# Patient Record
Sex: FEMALE | Race: WHITE | NOT HISPANIC OR LATINO | Employment: OTHER | URBAN - METROPOLITAN AREA
[De-identification: names, ages, dates, MRNs, and addresses within clinical notes are randomized per-mention and may not be internally consistent; named-entity substitution may affect disease eponyms.]

---

## 2017-07-13 ENCOUNTER — HOSPITAL ENCOUNTER (OUTPATIENT)
Dept: INTENSIVE CARE | Facility: HOSPITAL | Age: 77
End: 2017-07-14
Attending: UROLOGY | Admitting: UROLOGY

## 2017-12-15 ENCOUNTER — HOSPITAL ENCOUNTER (OUTPATIENT)
Dept: ONCOLOGY | Facility: HOSPITAL | Age: 77
End: 2017-12-16
Attending: INTERNAL MEDICINE | Admitting: INTERNAL MEDICINE

## 2017-12-15 LAB
ABS NEUT (OLG): 9.66 X10(3)/MCL (ref 2.1–9.2)
ALBUMIN SERPL-MCNC: 4.1 GM/DL (ref 3.4–5)
ALBUMIN/GLOB SERPL: 0.9 RATIO (ref 1.1–2)
ALP SERPL-CCNC: 102 UNIT/L (ref 38–126)
ALT SERPL-CCNC: 20 UNIT/L (ref 12–78)
APTT PPP: 37.1 SECOND(S) (ref 24.8–36.9)
AST SERPL-CCNC: 34 UNIT/L (ref 15–37)
BASOPHILS # BLD AUTO: 0 X10(3)/MCL (ref 0–0.2)
BASOPHILS NFR BLD AUTO: 0 %
BILIRUB SERPL-MCNC: 0.7 MG/DL (ref 0.2–1)
BILIRUBIN DIRECT+TOT PNL SERPL-MCNC: 0.1
BILIRUBIN DIRECT+TOT PNL SERPL-MCNC: 0.6 MG/DL (ref 0–0.8)
BNP BLD-MCNC: 34 PG/ML (ref 0–100)
BUN SERPL-MCNC: 13 MG/DL (ref 7–18)
CALCIUM SERPL-MCNC: 9 MG/DL (ref 8.5–10.1)
CHLORIDE SERPL-SCNC: 103 MMOL/L (ref 98–107)
CK MB SERPL-MCNC: 1.6 NG/ML (ref 0.5–3.6)
CK SERPL-CCNC: 102 UNIT/L (ref 26–192)
CO2 SERPL-SCNC: 27 MMOL/L (ref 21–32)
CREAT SERPL-MCNC: 0.77 MG/DL (ref 0.55–1.02)
EOSINOPHIL # BLD AUTO: 0.1 X10(3)/MCL (ref 0–0.9)
EOSINOPHIL NFR BLD AUTO: 1 %
ERYTHROCYTE [DISTWIDTH] IN BLOOD BY AUTOMATED COUNT: 12.3 % (ref 11.5–17)
GLOBULIN SER-MCNC: 4.6 GM/DL (ref 2.4–3.5)
GLUCOSE SERPL-MCNC: 114 MG/DL (ref 74–106)
HCT VFR BLD AUTO: 47.4 % (ref 37–47)
HGB BLD-MCNC: 15.4 GM/DL (ref 12–16)
INR PPP: 0.94 (ref 0–1.27)
LYMPHOCYTES # BLD AUTO: 1.4 X10(3)/MCL (ref 0.6–4.6)
LYMPHOCYTES NFR BLD AUTO: 12 %
MCH RBC QN AUTO: 31 PG (ref 27–31)
MCHC RBC AUTO-ENTMCNC: 32.5 GM/DL (ref 33–36)
MCV RBC AUTO: 95.4 FL (ref 80–94)
MONOCYTES # BLD AUTO: 0.5 X10(3)/MCL (ref 0.1–1.3)
MONOCYTES NFR BLD AUTO: 4 %
NEUTROPHILS # BLD AUTO: 9.66 X10(3)/MCL (ref 1.4–7.9)
NEUTROPHILS NFR BLD AUTO: 82 %
PLATELET # BLD AUTO: 290 X10(3)/MCL (ref 130–400)
PMV BLD AUTO: 9.5 FL (ref 9.4–12.4)
POC TROPONIN: 0 NG/ML (ref 0–0.08)
POTASSIUM SERPL-SCNC: 3.4 MMOL/L (ref 3.5–5.1)
PROT SERPL-MCNC: 8.7 GM/DL (ref 6.4–8.2)
PROTHROMBIN TIME: 12.9 SECOND(S) (ref 12.2–14.7)
RBC # BLD AUTO: 4.97 X10(6)/MCL (ref 4.2–5.4)
SODIUM SERPL-SCNC: 138 MMOL/L (ref 136–145)
TROPONIN I SERPL-MCNC: 0.02 NG/ML (ref 0.02–0.49)
TROPONIN I SERPL-MCNC: 0.02 NG/ML (ref 0.02–0.49)
WBC # SPEC AUTO: 11.7 X10(3)/MCL (ref 4.5–11.5)

## 2017-12-16 LAB
CHOLEST SERPL-MCNC: 190 MG/DL (ref 0–200)
CHOLEST/HDLC SERPL: 3.3 {RATIO} (ref 0–4)
CK MB SERPL-MCNC: 1.4 NG/ML (ref 0.5–3.6)
CK SERPL-CCNC: 94 UNIT/L (ref 26–192)
HDLC SERPL-MCNC: 57 MG/DL (ref 35–60)
LDLC SERPL CALC-MCNC: 121 MG/DL (ref 0–129)
TRIGL SERPL-MCNC: 62 MG/DL (ref 30–150)
TROPONIN I SERPL-MCNC: 0.02 NG/ML (ref 0.02–0.49)
VLDLC SERPL CALC-MCNC: 12 MG/DL

## 2018-09-06 ENCOUNTER — HISTORICAL (OUTPATIENT)
Dept: RADIOLOGY | Facility: HOSPITAL | Age: 78
End: 2018-09-06

## 2022-04-30 NOTE — ED PROVIDER NOTES
Patient:   Fanny Armijo            MRN: [id number]            FIN: [id number]               Age:   77 years     Sex:  Female     :  1940   Associated Diagnoses:   Substernal chest pain   Author:   Ashlie Mendoza MD      Basic Information   Time seen: Date & time 12/15/2017 10:46:00.   History source: Patient.   Arrival mode: Ambulance.   History limitation: None.   Additional information: Chief Complaint from Nursing Triage Note : Chief Complaint   12/15/2017 10:07 CST     Chief Complaint           Pt c/o chest pain since 0800 this morning.  .      History of Present Illness   The patient presents with chest pain.  The onset was 3  hours ago.  The course/duration of symptoms is improving.  Location: substernal. Radiating pain: none. The character of symptoms is heaviness and pressure.  The degree at onset was minimal, 4 /10.  The degree at maximum was minimal, 4 /10.  .  The degree at present is none.  The exacerbating factor is none.  Risk factors consist of none.  Prior episodes: none.  Therapy today None.  Associated symptoms: shortness of breath, nausea, diaphoresis, denies vomiting, denies anxiety and denies palpitations.    77 year old white female with a history of IBD, diverticulosis, and nephrolithiasis who presents with substernal chest pain x 3 hours. Pain started while patient was sitting writing [employer] cards this morning, started as epigastric gas like pain that then moved into her chest - described as pressure-like pain, no radiation to arm or jaw, no aggravating or alleviating factors. Patient states pain improved significantly since onset, did not take any rx or OTC meds for pain. Reports associated sob, diaphoresis, and nausea (no vomiting). No cardiac history. Never smoker. Denies any history of similar episodes. Takes multivitamin daily, no other meds.        Review of Systems   Constitutional symptoms:  No fever, no chills, no sweats, no weakness, no fatigue, no decreased  activity.    Skin symptoms:  No jaundice, no rash, no petechiae, no lesion.    Eye symptoms:  Vision unchanged, no recent vision problems, no pain, no blurred vision.    ENMT symptoms:  No ear pain, no sore throat, no nasal congestion, no sinus pain.    Respiratory symptoms:  Shortness of breath, no cough, no hemoptysis, no wheezing.    Cardiovascular symptoms:  Chest pain, pressure, substernal, diaphoresis, no palpitations, no syncope, no peripheral edema.    Gastrointestinal symptoms:  Nausea, no abdominal pain, no vomiting, no diarrhea, no constipation.    Genitourinary symptoms:  No dysuria, no hematuria, no vaginal bleeding.    Musculoskeletal symptoms:  No back pain, no Joint pain.    Neurologic symptoms:  No headache, no dizziness, no numbness, no tingling, no weakness.    Psychiatric symptoms:  No anxiety, no depression.    Endocrine symptoms:  Negative except as documented in HPI.   Hematologic/Lymphatic symptoms:  Negative except as documented in HPI.             Additional review of systems information: All other systems reviewed and otherwise negative.      Health Status   Allergies: No known allergies.   Medications: Multivitamin.      Past Medical/ Family/ Social History   Medical history: IBD/Ulcerative colitis (in remission), Diverticulosis, Nephrolithiasis, Endometriosis (resolved).   Surgical history: Hysterectomy, Appendectomy, Cystoscopy w/ureteral stent placement, Cystocele with perivaginal repair, polypectomy (colon), Ovarian cyst removal.   Social history: Alcohol use: never, Tobacco use: never, Drug use: never.      Physical Examination               Vital Signs   Vital Signs   12/15/2017 10:07 CST     Temperature Oral          36.9 DegC                             Temperature Oral (calculated)             98.42 DegF                             Peripheral Pulse Rate     82 bpm                             Respiratory Rate          18 br/min                             SpO2                       97 %                             Oxygen Therapy            Room air                             Systolic Blood Pressure   151 mmHg  HI                             Diastolic Blood Pressure  90 mmHg  .   General:  Alert, no acute distress.    Skin:  Warm, dry, intact.    Head:  Normocephalic, atraumatic.    Neck:  Supple, trachea midline, no tenderness, no carotid bruit.    Eye:  Pupils are equal, round and reactive to light, extraocular movements are intact, normal conjunctiva, vision unchanged.    Ears, nose, mouth and throat:  Tympanic membranes clear, oral mucosa moist, no pharyngeal erythema or exudate.    Cardiovascular:  Regular rate and rhythm, No murmur, Normal peripheral perfusion, No edema.    Respiratory:  Lungs are clear to auscultation, respirations are non-labored, breath sounds are equal, Symmetrical chest wall expansion.    Chest wall:  No tenderness, No deformity.    Back:  Nontender, Normal range of motion, Normal alignment, no step-offs.    Musculoskeletal:  Normal ROM, normal strength, no tenderness, no swelling, no deformity.    Gastrointestinal:  Soft, Nontender, Non distended, Normal bowel sounds, No organomegaly.    Genitourinary:  No suprapubic or CVA tenderness appreciated.   Neurological:  Alert and oriented to person, place, time, and situation, No focal neurological deficit observed, CN II-XII intact, normal sensory observed, normal motor observed.    Lymphatics:  No lymphadenopathy.   Psychiatric:  Cooperative.      Medical Decision Making   Differential Diagnosis:  Unstable angina, angina, anxiety, atypical chest pain, costochondritis, chest wall pain.    Documents reviewed:  Emergency department nurses' notes, emergency department records, prior records.    Electrocardiogram:  Time 12/15/2017 10:09:00, rate 83, normal sinus rhythm, no ectopy, normal MS & QRS intervals, Nonspecific T wave abnormality.    Results review:  Lab results : Lab View   12/15/2017 10:36 CST     POC BNP iSTAT              34 pg/mL    12/15/2017 10:35 CST     POC Troponin              0.00 ng/mL    12/15/2017 10:30 CST     Sodium Lvl                138 mmol/L                             Potassium Lvl             3.4 mmol/L  LOW                             Chloride                  103 mmol/L                             CO2                       27.0 mmol/L                             Calcium Lvl               9.0 mg/dL                             Glucose Lvl               114 mg/dL  HI                             BUN                       13.0 mg/dL                             Creatinine                0.77 mg/dL                             eGFR-AA                   >60 mL/min/1.73 m2  NA                             eGFR-KRYSTYNA                  >60 mL/min/1.73 m2  NA                             Bili Total                0.7 mg/dL                             Bili Direct               0.10                             Bili Indirect             0.60 mg/dL                             AST                       34 unit/L                             ALT                       20 unit/L                             Alk Phos                  102 unit/L                             Total Protein             8.7 gm/dL  HI                             Albumin Lvl               4.10 gm/dL                             Globulin                  4.60 gm/dL  HI                             A/G Ratio                 0.9 ratio  LOW                             Troponin-I                0.02 ng/mL                             PT                        12.9 second(s)                             INR                       0.94                             PTT                       37.1 second(s)  HI                             WBC                       11.7 x10(3)/mcL  HI                             RBC                       4.97 x10(6)/mcL                             Hgb                       15.4 gm/dL                             Hct                        47.4 %  HI                             Platelet                  290 x10(3)/mcL                             MCV                       95.4 fL  HI                             MCH                       31.0 pg                             MCHC                      32.5 gm/dL  LOW                             RDW                       12.3 %                             MPV                       9.5 fL                             Abs Neut                  9.66 x10(3)/mcL  HI                             Neutro Auto               82 %  NA                             Lymph Auto                12 %  NA                             Mono Auto                 4 %  NA                             Eos Auto                  1 %  NA                             Abs Eos                   0.1 x10(3)/mcL                             Basophil Auto             0 %  NA                             Abs Neutro                9.66 x10(3)/mcL  HI                             Abs Lymph                 1.4 x10(3)/mcL                             Abs Mono                  0.5 x10(3)/mcL                             Abs Baso                  0.0 x10(3)/mcL    .   Chest X-Ray:  Time reported 12/15/2017 11:03:00.      Reexamination/ Reevaluation   Time: 12/15/2017 12:14:00 .   Vital signs   results included from flowsheet : Vital Signs   12/15/2017 12:00 CST     Peripheral Pulse Rate     112 bpm  HI                             Heart Rate Monitored      120 bpm  HI                             Respiratory Rate          12 br/min                             SpO2                      97 %                             Oxygen Therapy            Room air                             Systolic Blood Pressure   148 mmHg  HI                             Diastolic Blood Pressure  81 mmHg                             Mean Arterial Pressure, Cuff              103 mmHg     Course: improving.   Pain status: decreased, Patient reports continued pressure like chest pain however  has decreased in intensity. Given GI cocktail for epigastric/left sided gassy abdominal pain. Initial troponins 0.00, EKG showing no acute changes since previous EKG in June 2017. CMP and CBC with no significant abnormalities.      Impression and Plan   Diagnosis   Substernal chest pain ([id number] R07.2)      Calls-Consults   -  12/15/2017 12:20:00 , Abi HERNANDES, ALMAZ Hernandez, consult, recommends admission to rule out acute coronary syndrome with serial EKGs and cardiac enzymes.    Plan   Condition: Improved.    Disposition: Admit time  12/15/2017 12:28:00, Place in Observation Unit.    Counseled: Patient, Regarding diagnosis, Regarding diagnostic results, Regarding treatment plan, Patient indicated understanding of instructions.

## 2022-04-30 NOTE — OP NOTE
DATE OF SURGERY:    07/13/2017    SURGEON:  Thania Bolden MD    PREOPERATIVE DIAGNOSIS:  Left distal ureteral stone.    POSTOPERATIVE DIAGNOSIS:  Left distal ureteral stone.    PROCEDURE:  Left ureteroscopy with Holmium laser lithotripsy, extraction of ureteral stone fragments and placement of ureteral stent.    ANESTHESIA:  General.    INDICATIONS:  The patient is a 76-year-old patient who presented to the hospital a few weeks back with an infected distal ureteral stone estimated between 5 and 6 mm.  The patient underwent ureteral stenting at that time due to the infected nature of her urine and the elevated white count. Ureteroscopy was not offered for fear of making the patient septic.  She tolerated her stent well and was discharged home with oral antibiotics and the elective ureteroscopy with stone extraction, possible laser was planned for today.    PROCEDURE IN DETAIL:  After satisfactory informed written consent was obtained, she was taken to the operating suite.  General anesthesia was administered.  She was placed in the dorsal lithotomy position.  The area of the genitalia was prepped and draped sterilely. The 22-Bolivian cystourethroscope was inserted into the bladder.  The bladder did have some edema surrounding the left ureteral orifice as expected, but no other urothelial lesions.  The tip of the ureteral stent had migrated up into the ureter and it was not possible to grasp this with a flexible cystoscope rasper.  Therefore, the cystoscope was removed after a failed attempt at placement of a wire alongside the stent.  The ureteroscope was used to gain access into the lower ureter.  The tip of the stent was seen.  It was engaged with a three prong grasper and it was extracted from the ureteral orifice.  The cystoscope was reinserted.  The tip of the stent was grasped with a flexible cystoscope grasper and extracted from the urethra.  The guide wire was inserted through the ureteral  stent to the renal pelvis. Over this was inserted an open-ended ureteral catheter.  Retrograde pyelogram confirming placement within the collecting system.  The kidney was confirmed.  The wire was once again placed and used as a safety wire.  It was secured to the drapes.  The rigid ureteroscope was once again used and the lower ureter was accessed.  The stone was encountered in the lower ureter and using the 365 micrometer holmium laser lithotripsy fiber, the stone was fractured into jefferson fragments.  One of the larger fragments migrated cephalad and this was followed with the rigid ureteroscope.  The fragment was engaged with a 3 wire basket and extracted from the ureter.  No other stones or structures or trauma to the ureter was identified.  The wire was back-loaded into a cystoscope and over this wire was inserted a 6-Telugu x 26-cm double J ureteral stent.  There is a good proximal and distal coil upon the stent, when it was deployed.  The bladder was drained and the endoscope was removed. A small section of the pull out string was left intact and hanging from the patient's urethra.  This will be used in the coming days to remove her ureteral stent.  10 mL of 2% viscous lidocaine was injected into the urethra as topical anesthetic.  The bladder was drained.  The patient was awakened and transferred to the recovery room in stable condition.  Due to the late hour, and the fact that the patient lives alone and is elderly, she will be housed overnight for observation and discharged in the morning providing no complications encountered.        ______________________________  MD MARIS Schmitz/ROCIO  DD:  07/13/2017  Time:  07:10PM  DT:  07/14/2017  Time:  12:38PM  Job #:  [id number]

## 2022-04-30 NOTE — DISCHARGE SUMMARY
DISCHARGE DATE:  07/14/2017    DISCHARGE DIAGNOSIS:  Left distal ureteral stone.    PROCEDURE PERFORMED:  Left ureteroscopy with laser lithotripsy and extraction of stone with placement of ureteral stent.    HISTORY OF PRESENT ILLNESS/HOSPITAL COURSE:  Ms. Armijo is a 76-year-old patient who presented to the hospital a few weeks back with a urinary tract infection and an obstructing stone in the lower ureter on the left side.  She underwent ureteral stenting, placed on IV antibiotics and housed overnight.  She was discharged home on oral medications with plans to perform an elective stone extraction, which was scheduled for today.  The patient is elderly, lives alone and has no family in town; is reliant on the hospitality of her friends for transportation and due to the late hours, she was housed overnight for observation.  She experienced no postoperative complications and will be discharged home in the morning, provided no complications will occur and she is afebrile.    DISCHARGE INSTRUCTIONS:  She will be resumed on home medications, which include a multivitamin.  She may use Advil for pain.  She received a single dose of Rocephin, 1 gram IV at the time of surgery and she will be given peridium as needed for bladder discomfort upon discharge.  Should she have fever, increasing nausea, vomiting, or pain, she is to present to the emergency room or call the office.        ______________________________  MD MARIS Schmitz/WALTER  DD:  07/13/2017  Time:  07:12PM  DT:  07/14/2017  Time:  08:46AM  Job #:  [id number]

## 2022-04-30 NOTE — ED PROVIDER NOTES
"   Patient:   Fanny Armijo            MRN: [id number]            FIN: [id number]               Age:   77 years     Sex:  Female     :  1940   Associated Diagnoses:   None   Author:   Jimmy Thomas MD      Addendum      Teaching-Supervisory Addendum-Brief   I participated in the following activities of this patients care: the medical history, the physical exam, medical decision making.   I personally performed: supervision of the patient's care, the medical history, the physical exam, the medical decision making.   The case was discussed with: the resident.   Evaluation and management service: I agree with the evaluation and management decisions made in this patient's care.   Results interpretation: I agree with the study interpretation in this patient's care.   Vital signs: Basic Oxygen Information   12/15/2017 10:07 CST     SpO2                      97 %                             Oxygen Therapy            Room air  .   Notes: Pt seen and examined.  Dr. Mendoza overseeing care.  Pt presents with substernal CP, onset this AM while writing [employer] cards.  States symptoms began like her typical gas pains, but then moved into her chest which has never occurred before.  CP largely resolved now - states "my guts are churning."  EKG unremarkable.  Will check labs and CXR and give trial of GI cocktail.   "

## 2023-05-27 ENCOUNTER — HOSPITAL ENCOUNTER (INPATIENT)
Facility: HOSPITAL | Age: 83
LOS: 2 days | Discharge: HOME | DRG: 872 | End: 2023-05-30
Attending: EMERGENCY MEDICINE | Admitting: EMERGENCY MEDICINE
Payer: MEDICARE

## 2023-05-27 DIAGNOSIS — N12 PYELONEPHRITIS OF LEFT KIDNEY: ICD-10-CM

## 2023-05-27 DIAGNOSIS — N20.1 URETEROLITHIASIS: ICD-10-CM

## 2023-05-27 DIAGNOSIS — I49.9 CARDIAC RHYTHM DISORDER OR DISTURBANCE OR CHANGE: ICD-10-CM

## 2023-05-27 DIAGNOSIS — R00.1 BRADYCARDIA: ICD-10-CM

## 2023-05-27 DIAGNOSIS — I25.10 CAD (CORONARY ARTERY DISEASE): ICD-10-CM

## 2023-05-27 DIAGNOSIS — A41.9 SEPSIS, DUE TO UNSPECIFIED ORGANISM, UNSPECIFIED WHETHER ACUTE ORGAN DYSFUNCTION PRESENT: Primary | ICD-10-CM

## 2023-05-27 DIAGNOSIS — R07.9 CHEST PAIN: ICD-10-CM

## 2023-05-27 DIAGNOSIS — R50.9 FEVER: ICD-10-CM

## 2023-05-27 LAB
ALBUMIN SERPL-MCNC: 3.4 G/DL (ref 3.4–4.8)
ALBUMIN/GLOB SERPL: 1.3 RATIO (ref 1.1–2)
ALP SERPL-CCNC: 88 UNIT/L (ref 40–150)
ALT SERPL-CCNC: 12 UNIT/L (ref 0–55)
AST SERPL-CCNC: 27 UNIT/L (ref 5–34)
BASOPHILS # BLD AUTO: 0.07 X10(3)/MCL
BASOPHILS NFR BLD AUTO: 0.4 %
BILIRUBIN DIRECT+TOT PNL SERPL-MCNC: 0.6 MG/DL
BUN SERPL-MCNC: 21.8 MG/DL (ref 9.8–20.1)
CALCIUM SERPL-MCNC: 8.8 MG/DL (ref 8.4–10.2)
CHLORIDE SERPL-SCNC: 108 MMOL/L (ref 98–107)
CO2 SERPL-SCNC: 21 MMOL/L (ref 23–31)
CREAT SERPL-MCNC: 1.07 MG/DL (ref 0.55–1.02)
EOSINOPHIL # BLD AUTO: 0.01 X10(3)/MCL (ref 0–0.9)
EOSINOPHIL NFR BLD AUTO: 0.1 %
ERYTHROCYTE [DISTWIDTH] IN BLOOD BY AUTOMATED COUNT: 12.9 % (ref 11.5–17)
GFR SERPLBLD CREATININE-BSD FMLA CKD-EPI: 52 MLS/MIN/1.73/M2
GLOBULIN SER-MCNC: 2.7 GM/DL (ref 2.4–3.5)
GLUCOSE SERPL-MCNC: 121 MG/DL (ref 82–115)
HCT VFR BLD AUTO: 39.5 % (ref 37–47)
HGB BLD-MCNC: 13 G/DL (ref 12–16)
IMM GRANULOCYTES # BLD AUTO: 0.17 X10(3)/MCL (ref 0–0.04)
IMM GRANULOCYTES NFR BLD AUTO: 0.9 %
LACTATE SERPL-SCNC: 2.5 MMOL/L (ref 0.5–2.2)
LYMPHOCYTES # BLD AUTO: 0.36 X10(3)/MCL (ref 0.6–4.6)
LYMPHOCYTES NFR BLD AUTO: 1.9 %
MCH RBC QN AUTO: 31 PG (ref 27–31)
MCHC RBC AUTO-ENTMCNC: 32.9 G/DL (ref 33–36)
MCV RBC AUTO: 94 FL (ref 80–94)
MONOCYTES # BLD AUTO: 0.18 X10(3)/MCL (ref 0.1–1.3)
MONOCYTES NFR BLD AUTO: 1 %
NEUTROPHILS # BLD AUTO: 18.01 X10(3)/MCL (ref 2.1–9.2)
NEUTROPHILS NFR BLD AUTO: 95.7 %
NRBC BLD AUTO-RTO: 0 %
PLATELET # BLD AUTO: 237 X10(3)/MCL (ref 130–400)
PMV BLD AUTO: 9.1 FL (ref 7.4–10.4)
POTASSIUM SERPL-SCNC: 3 MMOL/L (ref 3.5–5.1)
PROT SERPL-MCNC: 6.1 GM/DL (ref 5.8–7.6)
RBC # BLD AUTO: 4.2 X10(6)/MCL (ref 4.2–5.4)
SODIUM SERPL-SCNC: 139 MMOL/L (ref 136–145)
WBC # SPEC AUTO: 18.8 X10(3)/MCL (ref 4.5–11.5)

## 2023-05-27 PROCEDURE — 25000003 PHARM REV CODE 250: Performed by: EMERGENCY MEDICINE

## 2023-05-27 PROCEDURE — 63600175 PHARM REV CODE 636 W HCPCS: Performed by: EMERGENCY MEDICINE

## 2023-05-27 PROCEDURE — 83605 ASSAY OF LACTIC ACID: CPT | Performed by: EMERGENCY MEDICINE

## 2023-05-27 PROCEDURE — 96365 THER/PROPH/DIAG IV INF INIT: CPT

## 2023-05-27 PROCEDURE — 80053 COMPREHEN METABOLIC PANEL: CPT | Performed by: EMERGENCY MEDICINE

## 2023-05-27 PROCEDURE — P9612 CATHETERIZE FOR URINE SPEC: HCPCS

## 2023-05-27 PROCEDURE — 87154 CUL TYP ID BLD PTHGN 6+ TRGT: CPT | Performed by: EMERGENCY MEDICINE

## 2023-05-27 PROCEDURE — 96361 HYDRATE IV INFUSION ADD-ON: CPT

## 2023-05-27 PROCEDURE — 87077 CULTURE AEROBIC IDENTIFY: CPT | Performed by: EMERGENCY MEDICINE

## 2023-05-27 PROCEDURE — 85025 COMPLETE CBC W/AUTO DIFF WBC: CPT | Performed by: EMERGENCY MEDICINE

## 2023-05-27 PROCEDURE — 99285 EMERGENCY DEPT VISIT HI MDM: CPT | Mod: 25

## 2023-05-27 RX ORDER — ACETAMINOPHEN 500 MG
1000 TABLET ORAL
Status: COMPLETED | OUTPATIENT
Start: 2023-05-27 | End: 2023-05-27

## 2023-05-27 RX ADMIN — ACETAMINOPHEN 1000 MG: 500 TABLET, FILM COATED ORAL at 10:05

## 2023-05-27 RX ADMIN — IOPAMIDOL 100 ML: 755 INJECTION, SOLUTION INTRAVENOUS at 11:05

## 2023-05-27 RX ADMIN — SODIUM CHLORIDE, POTASSIUM CHLORIDE, SODIUM LACTATE AND CALCIUM CHLORIDE 1000 ML: 600; 310; 30; 20 INJECTION, SOLUTION INTRAVENOUS at 10:05

## 2023-05-27 RX ADMIN — PIPERACILLIN AND TAZOBACTAM 4.5 G: 4; .5 INJECTION, POWDER, LYOPHILIZED, FOR SOLUTION INTRAVENOUS; PARENTERAL at 10:05

## 2023-05-28 ENCOUNTER — ANESTHESIA EVENT (OUTPATIENT)
Dept: SURGERY | Facility: HOSPITAL | Age: 83
DRG: 872 | End: 2023-05-28
Payer: MEDICARE

## 2023-05-28 ENCOUNTER — ANESTHESIA (OUTPATIENT)
Dept: SURGERY | Facility: HOSPITAL | Age: 83
DRG: 872 | End: 2023-05-28
Payer: MEDICARE

## 2023-05-28 PROBLEM — N20.1 URETEROLITHIASIS: Status: ACTIVE | Noted: 2023-05-28

## 2023-05-28 LAB
ABS NEUT (OLG): 29.98 X10(3)/MCL (ref 2.1–9.2)
ALBUMIN SERPL-MCNC: 2.8 G/DL (ref 3.4–4.8)
ALBUMIN/GLOB SERPL: 1 RATIO (ref 1.1–2)
ALP SERPL-CCNC: 65 UNIT/L (ref 40–150)
ALT SERPL-CCNC: 14 UNIT/L (ref 0–55)
APPEARANCE UR: CLEAR
AST SERPL-CCNC: 26 UNIT/L (ref 5–34)
AV INDEX (PROSTH): 0.39
AV MEAN GRADIENT: 5 MMHG
AV PEAK GRADIENT: 11 MMHG
AV VALVE AREA: 1.35 CM2
AV VELOCITY RATIO: 0.45
BACTERIA #/AREA URNS AUTO: ABNORMAL /HPF
BASOPHILS NFR BLD MANUAL: 0.31 X10(3)/MCL (ref 0–0.2)
BASOPHILS NFR BLD MANUAL: 1 %
BILIRUB UR QL STRIP.AUTO: NEGATIVE MG/DL
BILIRUBIN DIRECT+TOT PNL SERPL-MCNC: 0.6 MG/DL
BSA FOR ECHO PROCEDURE: 1.66 M2
BUN SERPL-MCNC: 19.8 MG/DL (ref 9.8–20.1)
CALCIUM SERPL-MCNC: 9.1 MG/DL (ref 8.4–10.2)
CHLORIDE SERPL-SCNC: 109 MMOL/L (ref 98–107)
CO2 SERPL-SCNC: 24 MMOL/L (ref 23–31)
COLOR UR: YELLOW
CREAT SERPL-MCNC: 1.21 MG/DL (ref 0.55–1.02)
CV ECHO LV RWT: 0.92 CM
DOP CALC AO PEAK VEL: 1.64 M/S
DOP CALC AO VTI: 36.1 CM
DOP CALC LVOT AREA: 3.5 CM2
DOP CALC LVOT DIAMETER: 2.1 CM
DOP CALC LVOT PEAK VEL: 0.74 M/S
DOP CALC LVOT STROKE VOLUME: 48.81 CM3
DOP CALC MV VTI: 38.1 CM
DOP CALCLVOT PEAK VEL VTI: 14.1 CM
E/A RATIO: 0.87
E/E' RATIO: 17 M/S
ECHO LV POSTERIOR WALL: 1.61 CM (ref 0.6–1.1)
EJECTION FRACTION: 55 %
ERYTHROCYTE [DISTWIDTH] IN BLOOD BY AUTOMATED COUNT: 13.2 % (ref 11.5–17)
FRACTIONAL SHORTENING: 26 % (ref 28–44)
GFR SERPLBLD CREATININE-BSD FMLA CKD-EPI: 45 MLS/MIN/1.73/M2
GLOBULIN SER-MCNC: 2.9 GM/DL (ref 2.4–3.5)
GLUCOSE SERPL-MCNC: 165 MG/DL (ref 82–115)
GLUCOSE UR QL STRIP.AUTO: ABNORMAL MG/DL
HCT VFR BLD AUTO: 34.7 % (ref 37–47)
HGB BLD-MCNC: 11.3 G/DL (ref 12–16)
INSTRUMENT WBC (OLG): 31.23 X10(3)/MCL
INTERVENTRICULAR SEPTUM: 1.63 CM (ref 0.6–1.1)
KETONES UR QL STRIP.AUTO: NEGATIVE MG/DL
LACTATE SERPL-SCNC: 2.5 MMOL/L (ref 0.5–2.2)
LEFT ATRIUM SIZE: 4 CM
LEFT INTERNAL DIMENSION IN SYSTOLE: 2.59 CM (ref 2.1–4)
LEFT VENTRICLE DIASTOLIC VOLUME INDEX: 30.84 ML/M2
LEFT VENTRICLE DIASTOLIC VOLUME: 51.2 ML
LEFT VENTRICLE MASS INDEX: 133 G/M2
LEFT VENTRICLE SYSTOLIC VOLUME INDEX: 14.7 ML/M2
LEFT VENTRICLE SYSTOLIC VOLUME: 24.4 ML
LEFT VENTRICULAR INTERNAL DIMENSION IN DIASTOLE: 3.51 CM (ref 3.5–6)
LEFT VENTRICULAR MASS: 220.5 G
LEUKOCYTE ESTERASE UR QL STRIP.AUTO: ABNORMAL UNIT/L
LV LATERAL E/E' RATIO: 21.25 M/S
LV SEPTAL E/E' RATIO: 14.17 M/S
LVOT MG: 1 MMHG
LVOT MV: 0.46 CM/S
LYMPHOCYTES NFR BLD MANUAL: 1.25 X10(3)/MCL
LYMPHOCYTES NFR BLD MANUAL: 4 %
MAGNESIUM SERPL-MCNC: 1.7 MG/DL (ref 1.6–2.6)
MCH RBC QN AUTO: 31.2 PG (ref 27–31)
MCHC RBC AUTO-ENTMCNC: 32.6 G/DL (ref 33–36)
MCV RBC AUTO: 95.9 FL (ref 80–94)
MV MEAN GRADIENT: 3 MMHG
MV PEAK A VEL: 0.98 M/S
MV PEAK E VEL: 0.85 M/S
MV PEAK GRADIENT: 7 MMHG
MV STENOSIS PRESSURE HALF TIME: 81 MS
MV VALVE AREA BY CONTINUITY EQUATION: 1.28 CM2
MV VALVE AREA P 1/2 METHOD: 2.72 CM2
NEUTROPHILS NFR BLD MANUAL: 96 %
NITRITE UR QL STRIP.AUTO: POSITIVE
NRBC BLD AUTO-RTO: 0 %
OHS LV EJECTION FRACTION SIMPSONS BIPLANE MOD: 6 %
PH UR STRIP.AUTO: 5.5 [PH]
PHOSPHATE SERPL-MCNC: 2.9 MG/DL (ref 2.3–4.7)
PISA TR MAX VEL: 2.65 M/S
PLATELET # BLD AUTO: 207 X10(3)/MCL (ref 130–400)
PLATELET # BLD EST: NORMAL 10*3/UL
PMV BLD AUTO: 9.1 FL (ref 7.4–10.4)
POTASSIUM SERPL-SCNC: 3.4 MMOL/L (ref 3.5–5.1)
PROT SERPL-MCNC: 5.7 GM/DL (ref 5.8–7.6)
PROT UR QL STRIP.AUTO: ABNORMAL MG/DL
PV PEAK VELOCITY: 1.21 CM/S
RA PRESSURE: 15 MMHG
RBC # BLD AUTO: 3.62 X10(6)/MCL (ref 4.2–5.4)
RBC #/AREA URNS AUTO: <5 /HPF
RBC MORPH BLD: NORMAL
RBC UR QL AUTO: ABNORMAL UNIT/L
RIGHT VENTRICULAR END-DIASTOLIC DIMENSION: 2.98 CM
SODIUM SERPL-SCNC: 139 MMOL/L (ref 136–145)
SP GR UR STRIP.AUTO: 1.01 (ref 1–1.03)
SQUAMOUS #/AREA URNS AUTO: <5 /HPF
TDI LATERAL: 0.04 M/S
TDI SEPTAL: 0.06 M/S
TDI: 0.05 M/S
TR MAX PG: 28 MMHG
TSH SERPL-ACNC: 0.97 UIU/ML (ref 0.35–4.94)
TV REST PULMONARY ARTERY PRESSURE: 43 MMHG
UROBILINOGEN UR STRIP-ACNC: 0.2 MG/DL
WBC # SPEC AUTO: 31.23 X10(3)/MCL (ref 4.5–11.5)
WBC #/AREA URNS AUTO: <5 /HPF

## 2023-05-28 PROCEDURE — 36000707: Performed by: SPECIALIST

## 2023-05-28 PROCEDURE — 37000008 HC ANESTHESIA 1ST 15 MINUTES: Performed by: SPECIALIST

## 2023-05-28 PROCEDURE — 80053 COMPREHEN METABOLIC PANEL: CPT | Performed by: NURSE PRACTITIONER

## 2023-05-28 PROCEDURE — 27000221 HC OXYGEN, UP TO 24 HOURS

## 2023-05-28 PROCEDURE — 25500020 PHARM REV CODE 255: Performed by: EMERGENCY MEDICINE

## 2023-05-28 PROCEDURE — 37000009 HC ANESTHESIA EA ADD 15 MINS: Performed by: SPECIALIST

## 2023-05-28 PROCEDURE — 63600175 PHARM REV CODE 636 W HCPCS: Performed by: NURSE PRACTITIONER

## 2023-05-28 PROCEDURE — 63600175 PHARM REV CODE 636 W HCPCS: Performed by: NURSE ANESTHETIST, CERTIFIED REGISTERED

## 2023-05-28 PROCEDURE — C1758 CATHETER, URETERAL: HCPCS | Performed by: SPECIALIST

## 2023-05-28 PROCEDURE — 93010 EKG 12-LEAD: ICD-10-PCS | Mod: ,,, | Performed by: INTERNAL MEDICINE

## 2023-05-28 PROCEDURE — 84100 ASSAY OF PHOSPHORUS: CPT | Performed by: NURSE PRACTITIONER

## 2023-05-28 PROCEDURE — 93010 ELECTROCARDIOGRAM REPORT: CPT | Mod: ,,, | Performed by: INTERNAL MEDICINE

## 2023-05-28 PROCEDURE — 83735 ASSAY OF MAGNESIUM: CPT | Performed by: NURSE PRACTITIONER

## 2023-05-28 PROCEDURE — 25000003 PHARM REV CODE 250: Performed by: NURSE ANESTHETIST, CERTIFIED REGISTERED

## 2023-05-28 PROCEDURE — 63600175 PHARM REV CODE 636 W HCPCS: Performed by: EMERGENCY MEDICINE

## 2023-05-28 PROCEDURE — 71000033 HC RECOVERY, INTIAL HOUR: Performed by: SPECIALIST

## 2023-05-28 PROCEDURE — 81001 URINALYSIS AUTO W/SCOPE: CPT | Performed by: EMERGENCY MEDICINE

## 2023-05-28 PROCEDURE — D9220A PRA ANESTHESIA: Mod: CRNA,,, | Performed by: NURSE ANESTHETIST, CERTIFIED REGISTERED

## 2023-05-28 PROCEDURE — 83605 ASSAY OF LACTIC ACID: CPT | Performed by: EMERGENCY MEDICINE

## 2023-05-28 PROCEDURE — 21400001 HC TELEMETRY ROOM

## 2023-05-28 PROCEDURE — 36000706: Performed by: SPECIALIST

## 2023-05-28 PROCEDURE — 85027 COMPLETE CBC AUTOMATED: CPT | Performed by: NURSE PRACTITIONER

## 2023-05-28 PROCEDURE — 93005 ELECTROCARDIOGRAM TRACING: CPT

## 2023-05-28 PROCEDURE — 25000003 PHARM REV CODE 250: Performed by: PHYSICIAN ASSISTANT

## 2023-05-28 PROCEDURE — D9220A PRA ANESTHESIA: Mod: ANES,,, | Performed by: ANESTHESIOLOGY

## 2023-05-28 PROCEDURE — 25000003 PHARM REV CODE 250: Performed by: INTERNAL MEDICINE

## 2023-05-28 PROCEDURE — D9220A PRA ANESTHESIA: ICD-10-PCS | Mod: CRNA,,, | Performed by: NURSE ANESTHETIST, CERTIFIED REGISTERED

## 2023-05-28 PROCEDURE — 94761 N-INVAS EAR/PLS OXIMETRY MLT: CPT

## 2023-05-28 PROCEDURE — 25000003 PHARM REV CODE 250: Performed by: NURSE PRACTITIONER

## 2023-05-28 PROCEDURE — 87077 CULTURE AEROBIC IDENTIFY: CPT | Performed by: INTERNAL MEDICINE

## 2023-05-28 PROCEDURE — 84443 ASSAY THYROID STIM HORMONE: CPT | Performed by: NURSE PRACTITIONER

## 2023-05-28 PROCEDURE — C1769 GUIDE WIRE: HCPCS | Performed by: SPECIALIST

## 2023-05-28 PROCEDURE — D9220A PRA ANESTHESIA: ICD-10-PCS | Mod: ANES,,, | Performed by: ANESTHESIOLOGY

## 2023-05-28 RX ORDER — ONDANSETRON 2 MG/ML
4 INJECTION INTRAMUSCULAR; INTRAVENOUS ONCE
Status: DISCONTINUED | OUTPATIENT
Start: 2023-05-28 | End: 2023-05-28 | Stop reason: HOSPADM

## 2023-05-28 RX ORDER — MAGNESIUM SULFATE HEPTAHYDRATE 40 MG/ML
4 INJECTION, SOLUTION INTRAVENOUS ONCE
Status: COMPLETED | OUTPATIENT
Start: 2023-05-28 | End: 2023-05-28

## 2023-05-28 RX ORDER — SUCCINYLCHOLINE CHLORIDE 20 MG/ML
INJECTION INTRAMUSCULAR; INTRAVENOUS
Status: DISCONTINUED | OUTPATIENT
Start: 2023-05-28 | End: 2023-05-28

## 2023-05-28 RX ORDER — HYDROMORPHONE HYDROCHLORIDE 2 MG/ML
0.2 INJECTION, SOLUTION INTRAMUSCULAR; INTRAVENOUS; SUBCUTANEOUS EVERY 5 MIN PRN
Status: DISCONTINUED | OUTPATIENT
Start: 2023-05-28 | End: 2023-05-28 | Stop reason: HOSPADM

## 2023-05-28 RX ORDER — PHENYLEPHRINE HYDROCHLORIDE 10 MG/ML
INJECTION INTRAVENOUS
Status: DISCONTINUED | OUTPATIENT
Start: 2023-05-28 | End: 2023-05-28

## 2023-05-28 RX ORDER — SODIUM CHLORIDE, SODIUM LACTATE, POTASSIUM CHLORIDE, CALCIUM CHLORIDE 600; 310; 30; 20 MG/100ML; MG/100ML; MG/100ML; MG/100ML
INJECTION, SOLUTION INTRAVENOUS CONTINUOUS
Status: DISCONTINUED | OUTPATIENT
Start: 2023-05-28 | End: 2023-05-30

## 2023-05-28 RX ORDER — MAG HYDROX/ALUMINUM HYD/SIMETH 200-200-20
30 SUSPENSION, ORAL (FINAL DOSE FORM) ORAL 4 TIMES DAILY PRN
Status: DISCONTINUED | OUTPATIENT
Start: 2023-05-28 | End: 2023-05-30 | Stop reason: HOSPADM

## 2023-05-28 RX ORDER — ACETAMINOPHEN 325 MG/1
650 TABLET ORAL EVERY 6 HOURS PRN
Status: DISCONTINUED | OUTPATIENT
Start: 2023-05-28 | End: 2023-05-30 | Stop reason: HOSPADM

## 2023-05-28 RX ORDER — SIMETHICONE 80 MG
1 TABLET,CHEWABLE ORAL 4 TIMES DAILY PRN
Status: DISCONTINUED | OUTPATIENT
Start: 2023-05-28 | End: 2023-05-30 | Stop reason: HOSPADM

## 2023-05-28 RX ORDER — ONDANSETRON 2 MG/ML
INJECTION INTRAMUSCULAR; INTRAVENOUS
Status: DISCONTINUED | OUTPATIENT
Start: 2023-05-28 | End: 2023-05-28

## 2023-05-28 RX ORDER — ROCURONIUM BROMIDE 10 MG/ML
INJECTION, SOLUTION INTRAVENOUS
Status: DISCONTINUED | OUTPATIENT
Start: 2023-05-28 | End: 2023-05-28

## 2023-05-28 RX ORDER — ENOXAPARIN SODIUM 100 MG/ML
40 INJECTION SUBCUTANEOUS EVERY 24 HOURS
Status: DISCONTINUED | OUTPATIENT
Start: 2023-05-28 | End: 2023-05-30 | Stop reason: HOSPADM

## 2023-05-28 RX ORDER — MAGNESIUM SULFATE HEPTAHYDRATE 40 MG/ML
2 INJECTION, SOLUTION INTRAVENOUS ONCE
Status: DISCONTINUED | OUTPATIENT
Start: 2023-05-28 | End: 2023-05-30

## 2023-05-28 RX ORDER — TALC
6 POWDER (GRAM) TOPICAL NIGHTLY PRN
Status: DISCONTINUED | OUTPATIENT
Start: 2023-05-28 | End: 2023-05-30 | Stop reason: HOSPADM

## 2023-05-28 RX ORDER — MEPERIDINE HYDROCHLORIDE 25 MG/ML
12.5 INJECTION INTRAMUSCULAR; INTRAVENOUS; SUBCUTANEOUS ONCE
Status: DISCONTINUED | OUTPATIENT
Start: 2023-05-28 | End: 2023-05-28 | Stop reason: HOSPADM

## 2023-05-28 RX ORDER — POLYETHYLENE GLYCOL 3350 17 G/17G
17 POWDER, FOR SOLUTION ORAL 2 TIMES DAILY PRN
Status: DISCONTINUED | OUTPATIENT
Start: 2023-05-28 | End: 2023-05-30 | Stop reason: HOSPADM

## 2023-05-28 RX ORDER — POTASSIUM CHLORIDE 20 MEQ/1
40 TABLET, EXTENDED RELEASE ORAL ONCE
Status: COMPLETED | OUTPATIENT
Start: 2023-05-28 | End: 2023-05-28

## 2023-05-28 RX ORDER — CALCIUM CHLORIDE INJECTION 100 MG/ML
INJECTION, SOLUTION INTRAVENOUS
Status: DISCONTINUED | OUTPATIENT
Start: 2023-05-28 | End: 2023-05-28

## 2023-05-28 RX ORDER — DIPHENHYDRAMINE HYDROCHLORIDE 50 MG/ML
25 INJECTION INTRAMUSCULAR; INTRAVENOUS EVERY 6 HOURS PRN
Status: DISCONTINUED | OUTPATIENT
Start: 2023-05-28 | End: 2023-05-28 | Stop reason: HOSPADM

## 2023-05-28 RX ORDER — PROPOFOL 10 MG/ML
VIAL (ML) INTRAVENOUS
Status: DISCONTINUED | OUTPATIENT
Start: 2023-05-28 | End: 2023-05-28

## 2023-05-28 RX ORDER — HYDROMORPHONE HYDROCHLORIDE 2 MG/ML
0.5 INJECTION, SOLUTION INTRAMUSCULAR; INTRAVENOUS; SUBCUTANEOUS EVERY 5 MIN PRN
Status: DISCONTINUED | OUTPATIENT
Start: 2023-05-28 | End: 2023-05-28 | Stop reason: HOSPADM

## 2023-05-28 RX ORDER — NALOXONE HCL 0.4 MG/ML
0.02 VIAL (ML) INJECTION
Status: DISCONTINUED | OUTPATIENT
Start: 2023-05-28 | End: 2023-05-30 | Stop reason: HOSPADM

## 2023-05-28 RX ADMIN — MAGNESIUM SULFATE HEPTAHYDRATE 4 G: 40 INJECTION, SOLUTION INTRAVENOUS at 11:05

## 2023-05-28 RX ADMIN — CALCIUM CHLORIDE INJECTION 0.2 G: 100 INJECTION, SOLUTION INTRAVENOUS at 02:05

## 2023-05-28 RX ADMIN — ONDANSETRON 4 MG: 2 INJECTION INTRAMUSCULAR; INTRAVENOUS at 02:05

## 2023-05-28 RX ADMIN — SODIUM CHLORIDE, POTASSIUM CHLORIDE, SODIUM LACTATE AND CALCIUM CHLORIDE: 600; 310; 30; 20 INJECTION, SOLUTION INTRAVENOUS at 04:05

## 2023-05-28 RX ADMIN — PIPERACILLIN AND TAZOBACTAM 4.5 G: 4; .5 INJECTION, POWDER, LYOPHILIZED, FOR SOLUTION INTRAVENOUS; PARENTERAL at 03:05

## 2023-05-28 RX ADMIN — SUCCINYLCHOLINE CHLORIDE 100 MG: 20 INJECTION, SOLUTION INTRAMUSCULAR; INTRAVENOUS at 02:05

## 2023-05-28 RX ADMIN — PIPERACILLIN AND TAZOBACTAM 4.5 G: 4; .5 INJECTION, POWDER, LYOPHILIZED, FOR SOLUTION INTRAVENOUS; PARENTERAL at 11:05

## 2023-05-28 RX ADMIN — PHENYLEPHRINE HYDROCHLORIDE 200 MCG: 10 INJECTION INTRAVENOUS at 02:05

## 2023-05-28 RX ADMIN — SODIUM CHLORIDE, SODIUM GLUCONATE, SODIUM ACETATE, POTASSIUM CHLORIDE AND MAGNESIUM CHLORIDE: 526; 502; 368; 37; 30 INJECTION, SOLUTION INTRAVENOUS at 01:05

## 2023-05-28 RX ADMIN — PIPERACILLIN AND TAZOBACTAM 4.5 G: 4; .5 INJECTION, POWDER, LYOPHILIZED, FOR SOLUTION INTRAVENOUS; PARENTERAL at 06:05

## 2023-05-28 RX ADMIN — ROCURONIUM BROMIDE 10 MG: 10 SOLUTION INTRAVENOUS at 02:05

## 2023-05-28 RX ADMIN — SODIUM CHLORIDE, POTASSIUM CHLORIDE, SODIUM LACTATE AND CALCIUM CHLORIDE 1000 ML: 600; 310; 30; 20 INJECTION, SOLUTION INTRAVENOUS at 12:05

## 2023-05-28 RX ADMIN — ENOXAPARIN SODIUM 40 MG: 40 INJECTION SUBCUTANEOUS at 04:05

## 2023-05-28 RX ADMIN — SODIUM CHLORIDE 1000 ML: 9 INJECTION, SOLUTION INTRAVENOUS at 01:05

## 2023-05-28 RX ADMIN — PROPOFOL 80 MG: 10 INJECTION, EMULSION INTRAVENOUS at 02:05

## 2023-05-28 RX ADMIN — POTASSIUM CHLORIDE 40 MEQ: 1500 TABLET, EXTENDED RELEASE ORAL at 09:05

## 2023-05-28 NOTE — OP NOTE
[facility]                       [address] [place] [id number]    PATIENT NAME:      CLEMENT BOLANOS  YOB: 1940  CSN:               [id number]  MRN:               [id number]  ADMIT DATE:        05/27/2023 21:50:00  PHYSICIAN:         Juan Casillas MD                          OPERATIVE REPORT      DATE OF SURGERY:    05/28/2023 00:00:00    SURGEON:  Juan Casillas MD    PREOPERATIVE DIAGNOSIS:  Left ureteral obstruction; pyelonephritis.    POSTOPERATIVE  DIAGNOSIS:  Left ureteral obstruction; pyelonephritis.    PROCEDURE:  Left double-J stent (5 x 24).    OPERATION IN DETAIL:  After preop consent, the patient was taken to procedure   room, placed in dorsal lithotomy position.  Preprocedure antibiotics were   provided.  A 22 Storz cystoscope was inserted per urethra.  Bladder was entered   and carefully examined.  There was lots of debris and pus within the bladder.    Samples of bladder urine were obtained.  The left ureteral orifice was   identified and cannulated using a Glidewire and a 5-German Flexitip catheter.    Once the wire was in position, I was able to manipulate it around the large   distal ureteral calculus.  Once the wire was up into the left collecting system,   which was easily visualized above as the patient was given IV contrast on her   CT scan.  A 5 x 24 stent was placed into the left collecting system.  Proper   coiling was identified within the renal pelvis and bladder.  The patient   tolerated the procedure well, transferred from the procedure room to recovery in   stable condition.        ______________________________  Juan Casillas MD    SHS/AQS  DD:  05/28/2023  Time:  02:42AM  DT:  05/28/2023  Time:  04:01AM  Job #:  [id number]      OPERATIVE REPORT

## 2023-05-28 NOTE — ED PROVIDER NOTES
Encounter Date: 5/27/2023    SCRIBE #1 NOTE: I, Chase Haider, am scribing for, and in the presence of,  Denise Castro MD. I have scribed the following portions of the note - Other sections scribed: HPI, ROS, PE.     History     Chief Complaint   Patient presents with    Abdominal Pain     Here via [facility] abd pain that began around 2 pm today hx of kindeny stones. Was 92% room air 99% on 2 liters      82 year old female with pmhx of nephrolithiasis, diverticulitis and colitis  presents to ED via EMS for abdominal pain onset 1400 today. Pt reports that pain is in her left abdomen. Pt reports additional symptoms of nausea, vomiting, fever and chills. Pt states that she has taken tylenol for her abdominal pain to no relief. Pt reports that she had a normal BM earlier today. Pt denies SOB, cough, and congestion. Per EMS pt was sating 92% on room air.   GI: Bandar Wisdom MD    The history is provided by the patient. No  was used.   Review of patient's allergies indicates:  No Known Allergies  No past medical history on file.  No past surgical history on file.  No family history on file.     Review of Systems   Constitutional:  Positive for chills and fever.   HENT:  Negative for congestion.    Respiratory:  Negative for cough and shortness of breath.    Gastrointestinal:  Positive for abdominal pain, nausea and vomiting.   Genitourinary:  Positive for flank pain. Negative for decreased urine volume.     Physical Exam     Initial Vitals [05/27/23 2107]   BP Pulse Resp Temp SpO2   104/60 (!) 135 20 (!) 102.2 °F (39 °C) 96 %      MAP       --         Physical Exam    Nursing note and vitals reviewed.  Constitutional: She appears well-developed and well-nourished. She appears ill.   Ill-appearing    HENT:   Head: Normocephalic and atraumatic.   Mouth/Throat: Oropharynx is clear and moist.   Eyes: Conjunctivae are normal. Pupils are equal, round, and reactive to light. No scleral icterus.    Neck: Neck supple.   Normal range of motion.  Cardiovascular:  Regular rhythm and normal heart sounds.   Tachycardia present.         No murmur heard.  Pulmonary/Chest: Breath sounds normal. No respiratory distress.   Abdominal: Abdomen is soft. She exhibits no distension. There is abdominal tenderness (mild, generalized). There is no rebound and no guarding.   Musculoskeletal:         General: Normal range of motion.      Cervical back: Normal range of motion and neck supple.     Neurological: She is alert and oriented to person, place, and time. GCS score is 15. GCS eye subscore is 4. GCS verbal subscore is 5. GCS motor subscore is 6.   Skin: Skin is warm and dry. No rash noted. No pallor.   Psychiatric: She has a normal mood and affect.       ED Course   Critical Care    Date/Time: 5/27/2023 10:16 PM  Performed by: Denise Castro MD  Authorized by: Denise Castro MD   Direct patient critical care time: 37 minutes  Ordering / reviewing critical care time: 6 minutes  Documentation critical care time: 4 minutes  Consulting other physicians critical care time: 7 minutes  Total critical care time (exclusive of procedural time) : 54 minutes  Critical care time was exclusive of separately billable procedures and treating other patients.  Critical care was necessary to treat or prevent imminent or life-threatening deterioration of the following conditions: circulatory failure, sepsis and shock.  Critical care was time spent personally by me on the following activities: blood draw for specimens, development of treatment plan with patient or surrogate, discussions with consultants, interpretation of cardiac output measurements, evaluation of patient's response to treatment, obtaining history from patient or surrogate, ordering and performing treatments and interventions, examination of patient, ordering and review of laboratory studies, ordering and review of radiographic studies, pulse oximetry, re-evaluation of  patient's condition and review of old charts.      Labs Reviewed   COMPREHENSIVE METABOLIC PANEL - Abnormal; Notable for the following components:       Result Value    Potassium Level 3.0 (*)     Chloride 108 (*)     Carbon Dioxide 21 (*)     Glucose Level 121 (*)     Blood Urea Nitrogen 21.8 (*)     Creatinine 1.07 (*)     All other components within normal limits   URINALYSIS, REFLEX TO URINE CULTURE - Abnormal; Notable for the following components:    Protein, UA 1+ (*)     Glucose, UA Trace (*)     Blood, UA 2+ (*)     Nitrites, UA Positive (*)     Leukocyte Esterase, UA Trace (*)     All other components within normal limits   LACTIC ACID, PLASMA - Abnormal; Notable for the following components:    Lactic Acid Level 2.5 (*)     All other components within normal limits   CBC WITH DIFFERENTIAL - Abnormal; Notable for the following components:    WBC 18.80 (*)     MCHC 32.9 (*)     Lymph # 0.36 (*)     Neut # 18.01 (*)     IG# 0.17 (*)     All other components within normal limits   LACTIC ACID, PLASMA - Abnormal; Notable for the following components:    Lactic Acid Level 2.5 (*)     All other components within normal limits   URINALYSIS, MICROSCOPIC - Abnormal; Notable for the following components:    Bacteria, UA 4+ (*)     All other components within normal limits   BLOOD CULTURE [facility]   BLOOD CULTURE [facility]   CBC W/ AUTO DIFFERENTIAL    Narrative:     The following orders were created for panel order CBC auto differential.  Procedure                               Abnormality         Status                     ---------                               -----------         ------                     CBC with Differential[[id number]]        Abnormal            Final result                 Please view results for these tests on the individual orders.          Imaging Results              CT Chest Abdomen Pelvis With Contrast (xpd) (Preliminary result)  Result time 05/27/23 23:29:32   Procedure changed from CT Abdomen  Pelvis With Contrast     Preliminary result by Waldo Maldonado MD (05/27/23 23:29:32)                   Narrative:    START OF REPORT:  Technique: CT Scan of the chest abdomen and pelvis was performed with intravenous contrast with axial as well as sagittal and, coronal images.    Dosage Information: Automated Exposure Control was utilized.    Comparison: Comparison is with study dated â2017-12-18 08:16:58â.    Clinical History: Sepsis.    Findings:  Neck: The visualized soft tissues of the neck appear unremarkable. The thyroid gland appear unremarkable.  Mediastinum: The mediastinal structures are within normal limits.  Heart: The heart size is within normal limits.  Aorta: Unremarkable appearing aorta. No aortic dissection or aneurysm is seen.  Pulmonary Arteries: No filling defects are seen in the pulmonary arteries to suggest pulmonary embolus to the sensitivity of the study.  Lungs: There is mild non specific dependent change at the lung bases. Otherwise clear lungs with no focal infiltrate or consolidation.  Pleura: No effusions or pneumothorax are identified.  Bony Structures:  Spine: Moderate multilevel spondylolytic changes are seen in the thoracic spine.  Ribs: An old healed fracture at the anterior end of right 5th rib .No acute rib fractures are identified.  Abdomen:  Abdominal Wall: No abdominal wall pathology is seen.  Liver: Stable 5 cm simple cyst in the left lobe of liver. The liver otherwise appears unremarkable.  Biliary System: No intrahepatic or extrahepatic biliary duct dilatation is seen.  Gallbladder: A single gallstone is seen in the gallbladder which otherwise appears unremarkable.  Pancreas: There may be some calcifications in the tail of the pancreas which may reflect an element of chronic pancreatitis however the pancreas otherwise appears unremarkable.  Spleen: The spleen appears unremarkable.  Adrenals: The adrenal glands appear hyperplastic bilaterally.  Kidneys: The right kidney  appears unremarkable with no stones masses or hydronephrosis with a few scattered small cysts. There is moderate left hydroureteronephrosis due to an 11 mm obstructive calculus in the distal ureter seen on series 10,image 58 series 2 image 103. There is inflammatory stranding around the left kidney and there may be several areas of diminished cortical enhancement such that an element of pyelonephritis is a consideration.  Aorta: The abdominal aorta appears unremarkable.  IVC: Unremarkable.  Bowel:  Esophagus: The visualized esophagus appears unremarkable.  Stomach: The stomach appears unremarkable.  Duodenum: Unremarkable appearing duodenum.  Small Bowel: The small bowel appears unremarkable.  Colon: There is moderate stool in the ascending descending and sigmoid colon which could reflect an element of constipation. Multiple scattered diverticula are seen predominantly in the descending and sigmoid colon. No associated inflammatory stranding is seen to suggest diverticulitis.  Appendix: The appendix is not identified but no inflammatory changes are seen in the right lower quadrant to suggest appendicitis.  Peritoneum: No intraperitoneal free air or ascites is seen.    Pelvis:  Bladder: The bladder appears overtly distended with multiple diverticulae along its lateral walls. This raises possibility of atonic bladder.  Female:  Uterus: The uterus is not identified consistent with history of hysterectomy.  Ovaries: No adnexal masses are seen.    Bony structures: Generalised osteopenia of visualised bones is noted.  Dorsal Spine: There is moderate multilevel spondylosis of the visualized dorsal spine. Chronic stable wedge compression fractures of L2 and L4 vertebrae are noted.  Bony Pelvis: The visualized bony structures of the pelvis appear unremarkable.      Impression:  1. There is moderate left hydroureteronephrosis due to an 11 mm obstructive calculus in the distal ureter seen on series 10,image 58 series 2 image  103. There is inflammatory stranding around the left kidney and there may be several areas of diminished cortical enhancement such that an element of pyelonephritis is a consideration. Correlate with clinical and laboratory findings as regards additional evaluation and follow-up.  2. No acute intrathoracic pathology identified. Details and other findings as discussed above.                                         X-Ray Chest AP Portable (In process)                      Medications   acetaminophen tablet 1,000 mg (1,000 mg Oral Given 5/27/23 2253)   lactated ringers bolus 1,000 mL (0 mLs Intravenous Stopped 5/27/23 2350)   piperacillin-tazobactam (ZOSYN) 4.5 g in dextrose 5 % in water (D5W) 5 % 100 mL IVPB (MB+) (0 g Intravenous Stopped 5/27/23 2350)   iopamidoL (ISOVUE-370) injection 100 mL (100 mLs Intravenous Given 5/27/23 2334)   lactated ringers bolus 1,000 mL (1,000 mLs Intravenous New Bag 5/28/23 0045)     Medical Decision Making:   History:   I obtained history from: EMS provider.       <> Summary of History: Per EMS, pt sating 92% on room air  Clinical Tests:   Radiological Study: Ordered and Reviewed   Medical Decision Making  Problems Addressed:  Fever: acute illness or injury  Pyelonephritis of left kidney: acute illness or injury that poses a threat to life or bodily functions  Sepsis, due to unspecified organism, unspecified whether acute organ dysfunction present: acute illness or injury that poses a threat to life or bodily functions  Ureterolithiasis: acute illness or injury that poses a threat to life or bodily functions      ED assessment:    Ms. Armijo presented with left lower quadrant abdominal pain, flank pain, nausea and vomiting, found to be febrile, tachycardic and borderline hypotensive.  Fluids started, antipyretics ordered.  Concern for sepsis.    Differential diagnosis (including but not limited to):   Urinary tract infection, ureterolithiasis, diverticulitis, intra-abdominal abscess,  sepsis, septic shock, pyelonephritis, infected kidney stone, pneumonia, bacteremia, bowel perforation    ED management:   Laboratory studies with leukocytosis, mild acute kidney injury, elevated lactic acid.  Ongoing fluid resuscitation with mild drop in BP, improving after 2 L.  UA suggestive of urinary tract infection, nitrite positive periods chest x-ray partially rotated, widened mediastinum with no recent prior available for comparison.  CT scan obtained demonstrates no acute intrathoracic pathology; however, 11 mm distal ureterolithiasis with obstructive features and findings concerning for left pyelonephritis.  Case discussed with Urology who agreed to proceed to OR for intervention.  Patient to be admitted to hospital medicine service.    My independent radiology interpretation:   Chest x-ray:  Widened mediastinum may be due to projection, no dense infiltrate or effusion      Amount and/or Complexity of Data Reviewed  Independent historian: none   Summary of history:   External data reviewed: notes from previous admissions  Summary of data reviewed:  Admitted to ICU in 2017 related to kidney stone at that time as well  Risk and benefits of testing: discussed   Labs: ordered and reviewed  Radiology: ordered and independent interpretation performed (see above or ED course)    Discussion of management or test interpretation with external provider(s): discussed with hospitalist physician and discussed with Urology consultant   Summary of discussion:  Agrees with IV fluids, will proceed directly to OR.    Risk  Decision regarding hospitalization  Shared decision making     Critical Care  30-74 minutes     IDenise MD personally performed the history, PE, MDM, and procedures as documented above and agree with the scribe's documentation.        Scribe Attestation:   Scribe #1: I performed the above scribed service and the documentation accurately describes the services I performed. I attest to the accuracy  of the note.    Attending Attestation:           Physician Attestation for Scribe:  Physician Attestation Statement for Scribe #1: I, Denise Castro MD, reviewed documentation, as scribed by Chase Haider in my presence, and it is both accurate and complete.           ED Course as of 05/28/23 0154   Sun May 28, 2023   0031 CT confirms 11 mm obstructing ureterolithiasis with features concerning for pyelonephritis.  Given concern for infected kidney stone/sepsis, Urology has been paged to discuss possible OR. [KS]   0037 Discussed with Dr. Casillas, will call out OR. [KS]   0140 Case discussed with Tamera Trent NP for the hospitalist service who accepts for admission.  May need to re-evaluate post procedure, if hemodynamic instability, may need to be upgraded to ICU. [KS]      ED Course User Index  [KS] Denise Castro MD                 Clinical Impression:   Final diagnoses:  [R50.9] Fever  [A41.9] Sepsis, due to unspecified organism, unspecified whether acute organ dysfunction present (Primary)  [N12] Pyelonephritis of left kidney  [N20.1] Ureterolithiasis        ED Disposition Condition    Admit Stable                Denise Castro MD  05/28/23 0154

## 2023-05-28 NOTE — H&P
[facility] Hospital Medicine History & Physical Examination       Patient Name: Fanny Armijo  MRN: [id number]  Patient Class: IP- Inpatient   Admission Date: 5/27/2023   Admitting Physician:Jimmy Farrell MD  Length of Stay: 0  Attending Physician: Crescencio Novak MD  Primary Care Provider: None  Face-to-Face encounter date: 05/28/2023  Code Status: Full Code  Chief Complaint: Abdominal Pain (Here via [facility] abd pain that began around 2 pm today hx of kindeny stones. Was 92% room air 99% on 2 liters )        Patient information was obtained from patient, patient's family, past medical records and ER records.     HISTORY OF PRESENT ILLNESS:   Fanny Armijo is a 82 y.o. White female with a past medical history of diverticulitis nephrolithiasis. The patient presented to [facility] on 5/27/2023 with a primary complaint of lower quadrant abdominal pain which began yesterday (05/27/2023).  She reports associated symptoms of chills, nausea and 4 episodes of vomiting.  Patient denies complaints of flank pain, chest pain, dizziness, weakness,. She is having epigastric pain since eating breakfast with burping.     Upon presentation to the ED, temperature a 102.2° F, heart rate 135, blood pressure 104/60, respiratory rate 20 and SpO2 96% on 2 L O2 via nasal cannula.  Heart rate intermittently dropping into the 40-50s. Labs with WBC 18.8, potassium 3.0, CO2 21, BUN/creatinine 21.8/1.07 (15/0.67 December 2017), lactic acid 2.5.  UA with less than 5 squamous epithelial cells, 4+ bacteria, trace leukocyte esterase, positive nitrites, 2+ occult blood, trace glucose 1+ protein.  Chest x-ray pending.  Preliminary report of CT abdomen pelvis and chest with contrast revealed moderate left hydroureteronephrosis due to an 11 mm obstructing calculus in the distal ureter with inflammatory stranding around the left kidney and several areas of diminished cortical enhancement so statin element of  pyelonephritis is in consideration but no acute intrathoracic abnormalities.  In the ED patient received Tylenol and Zosyn.  Urology was consulted and patient was taken to the OR this morning (05/28/2023) for a left double-J stent placement.  Patient is admitted to hospital medicine services for further medical management.    PAST MEDICAL HISTORY:   Diverticulitis   Nephrolithiasis     PAST SURGICAL HISTORY:   Hysterectomy   Colonoscopy   Cholecystectomy  Kidney stent   Lithotripsy    ALLERGIES:   Patient has no known allergies.    FAMILY HISTORY:   Mother: Myocardial infarction    SOCIAL HISTORY:   Denies tobacco, alcohol and illicit drug use    HOME MEDICATIONS:   As documented    REVIEW OF SYSTEMS:   Except as documented, all other systems reviewed and negative     PHYSICAL EXAM:     VITAL SIGNS: 24 HRS MIN & MAX LAST   Temp  Min: 97.3 °F (36.3 °C)  Max: 102.2 °F (39 °C) 98.2 °F (36.8 °C)   BP  Min: 85/52  Max: 113/62 103/62   Pulse  Min: 44  Max: 135  101   Resp  Min: 16  Max: 28 20   SpO2  Min: 84 %  Max: 96 % (!) 93 %       General appearance: Well-developed, well-nourished female in no apparent distress. No family at bedside.  HEENT: Atraumatic head. Moist mucous membranes of oral cavity.  Lungs: Clear to auscultation bilaterally.   Heart: Regular rhythm but labile rate from 40-110s. No edema bilateral lower extremity.   Abdomen: Soft, non-distended, non-tender. Bowel sounds are hyperactive.   Extremities: No cyanosis, clubbing. No deformities.  Skin: No Rash. Warm and dry.  Neuro: Awake, alert and oriented. Motor and sensory exams grossly intact.  Psych/mental status: Appropriate mood and affect. Cooperative. Responds appropriately to questions.       LABS AND IMAGING:     Recent Labs   Lab 05/27/23  2236 05/28/23  0641   WBC 18.80* 31.23*   RBC 4.20 3.62*   HGB 13.0 11.3*   HCT 39.5 34.7*   MCV 94.0 95.9*   MCH 31.0 31.2*   MCHC 32.9* 32.6*   RDW 12.9 13.2    207   MPV 9.1 9.1       Recent Labs   Lab  05/27/23 2236 05/28/23  0641    139   K 3.0* 3.4*   CO2 21* 24   BUN 21.8* 19.8   CREATININE 1.07* 1.21*   CALCIUM 8.8 9.1   MG  --  1.70   ALBUMIN 3.4 2.8*   ALKPHOS 88 65   ALT 12 14   AST 27 26   BILITOT 0.6 0.6       Microbiology Results (last 7 days)       Procedure Component Value Units Date/Time    Urine culture [[id number]] Collected: 05/28/23 0235    Order Status: Sent Specimen: Urine Updated: 05/28/23 0312    Urine Culture [id number] [[id number]] Collected: 05/28/23 0235    Order Status: Canceled Specimen: Urine, Bladder Updated: 05/28/23 0258    Urine Culture [id number] [[id number]] Collected: 05/28/23 0236    Order Status: Sent Specimen: Urine, Kidney left     Blood Culture #2 **CANNOT BE ORDERED STAT** [[id number]] Collected: 05/27/23 2236    Order Status: Resulted Specimen: Blood from Antecubital, Right Updated: 05/27/23 2239    Blood Culture #1 **CANNOT BE ORDERED STAT** [[id number]] Collected: 05/27/23 2236    Order Status: Resulted Specimen: Blood from Arm, Right Updated: 05/27/23 2239             SURG FL Surgery Fluoro Usage  See OP Notes for results.     IMPRESSION: See OP Notes for results.     This procedure was auto-finalized by: Virtual Radiologist  CT Chest Abdomen Pelvis With Contrast (xpd)  START OF REPORT:  Technique: CT Scan of the chest abdomen and pelvis was performed with intravenous contrast with axial as well as sagittal and, coronal images.    Dosage Information: Automated Exposure Control was utilized.    Comparison: Comparison is with study dated â2017-12-18 08:16:58â.    Clinical History: Sepsis.    Findings:  Neck: The visualized soft tissues of the neck appear unremarkable. The thyroid gland appear unremarkable.  Mediastinum: The mediastinal structures are within normal limits.  Heart: The heart size is within normal limits.  Aorta: Unremarkable appearing aorta. No aortic dissection or aneurysm is seen.  Pulmonary Arteries: No filling defects are seen in the pulmonary arteries to  suggest pulmonary embolus to the sensitivity of the study.  Lungs: There is mild non specific dependent change at the lung bases. Otherwise clear lungs with no focal infiltrate or consolidation.  Pleura: No effusions or pneumothorax are identified.  Bony Structures:  Spine: Moderate multilevel spondylolytic changes are seen in the thoracic spine.  Ribs: An old healed fracture at the anterior end of right 5th rib .No acute rib fractures are identified.  Abdomen:  Abdominal Wall: No abdominal wall pathology is seen.  Liver: Stable 5 cm simple cyst in the left lobe of liver. The liver otherwise appears unremarkable.  Biliary System: No intrahepatic or extrahepatic biliary duct dilatation is seen.  Gallbladder: A single gallstone is seen in the gallbladder which otherwise appears unremarkable.  Pancreas: There may be some calcifications in the tail of the pancreas which may reflect an element of chronic pancreatitis however the pancreas otherwise appears unremarkable.  Spleen: The spleen appears unremarkable.  Adrenals: The adrenal glands appear hyperplastic bilaterally.  Kidneys: The right kidney appears unremarkable with no stones masses or hydronephrosis with a few scattered small cysts. There is moderate left hydroureteronephrosis due to an 11 mm obstructive calculus in the distal ureter seen on series 10,image 58 series 2 image 103. There is inflammatory stranding around the left kidney and there may be several areas of diminished cortical enhancement such that an element of pyelonephritis is a consideration.  Aorta: The abdominal aorta appears unremarkable.  IVC: Unremarkable.  Bowel:  Esophagus: The visualized esophagus appears unremarkable.  Stomach: The stomach appears unremarkable.  Duodenum: Unremarkable appearing duodenum.  Small Bowel: The small bowel appears unremarkable.  Colon: There is moderate stool in the ascending descending and sigmoid colon which could reflect an element of constipation. Multiple  scattered diverticula are seen predominantly in the descending and sigmoid colon. No associated inflammatory stranding is seen to suggest diverticulitis.  Appendix: The appendix is not identified but no inflammatory changes are seen in the right lower quadrant to suggest appendicitis.  Peritoneum: No intraperitoneal free air or ascites is seen.    Pelvis:  Bladder: The bladder appears overtly distended with multiple diverticulae along its lateral walls. This raises possibility of atonic bladder.  Female:  Uterus: The uterus is not identified consistent with history of hysterectomy.  Ovaries: No adnexal masses are seen.    Bony structures: Generalised osteopenia of visualised bones is noted.  Dorsal Spine: There is moderate multilevel spondylosis of the visualized dorsal spine. Chronic stable wedge compression fractures of L2 and L4 vertebrae are noted.  Bony Pelvis: The visualized bony structures of the pelvis appear unremarkable.    Impression:  1. There is moderate left hydroureteronephrosis due to an 11 mm obstructive calculus in the distal ureter seen on series 10,image 58 series 2 image 103. There is inflammatory stranding around the left kidney and there may be several areas of diminished cortical enhancement such that an element of pyelonephritis is a consideration. Correlate with clinical and laboratory findings as regards additional evaluation and follow-up.  2. No acute intrathoracic pathology identified. Details and other findings as discussed above.        ASSESSMENT & PLAN:   Assessment:  Sepsis secondary to left pyelonephritis   11 mm obstructing ureter stone with moderate left hydroureteronephrosis status post double-J stent placement on 05/28/2023  Hypokalemia  Labile heart rate  History of nephrolithiasis and diverticulitis    Plan:  - Continue recommendations per Urology  - Continue with Zosyn  - Follow results of blood and urine cultures   - Tylenol as needed for fever   - IV fluid hydration  - 40  mEq of PO potassium chloride ordered.  Continue to monitor electrolytes  - Cardiology consulted for labile heart rate  - Telemetry monitoring   - Resume appropriate home medications when deemed necessary   - Labs in AM      VTE Prophylaxis: will be placed on Lovenox for DVT prophylaxis and will be advised to be as mobile as possible and sit in a chair as tolerated      __________________________________________________________________________  INPATIENT LIST OF MEDICATIONS     Current Facility-Administered Medications:     acetaminophen tablet 650 mg, 650 mg, Oral, Q6H PRN, SARAH JainCNP-BC    aluminum-magnesium hydroxide-simethicone 200-200-20 mg/5 mL suspension 30 mL, 30 mL, Oral, QID PRN, Tamera Trent AGACNP-BC    enoxaparin injection 40 mg, 40 mg, Subcutaneous, Daily, Tamera Trent AGACNP-BC    lactated ringers infusion, , Intravenous, Continuous, SRIDHAR JainP-BC, Last Rate: 75 mL/hr at 05/28/23 0437, New Bag at 05/28/23 0437    melatonin tablet 6 mg, 6 mg, Oral, Nightly PRN, PONCE Jain-BC    naloxone 0.4 mg/mL injection 0.02 mg, 0.02 mg, Intravenous, PRN, Tamera Trent AGACNP-BC    piperacillin-tazobactam (ZOSYN) 4.5 g in dextrose 5 % in water (D5W) 5 % 100 mL IVPB (MB+), 4.5 g, Intravenous, Q8H, PONCE Jain-BC, Last Rate: 25 mL/hr at 05/28/23 0631, 4.5 g at 05/28/23 0631    polyethylene glycol packet 17 g, 17 g, Oral, BID PRN, SRIDHAR JainP-BC    simethicone chewable tablet 80 mg, 1 tablet, Oral, QID PRN, SRIDHAR JainP-BC      Scheduled Meds:   enoxparin  40 mg Subcutaneous Daily    piperacillin-tazobactam (ZOSYN) IVPB  4.5 g Intravenous Q8H     Continuous Infusions:   lactated ringers 75 mL/hr at 05/28/23 0437     PRN Meds:.acetaminophen, aluminum-magnesium hydroxide-simethicone, melatonin, naloxone, polyethylene glycol, simethicone      Discharge Planning and Disposition: Anticipated discharge to be determined.    Manjula FERNANDEZ,  PA, have reviewed and discussed the case with Dr. Crescencio Novak.    Please see the following addendum for further assessment and plan from there attending MD.    Manjula Farrell PA-C  05/28/2023

## 2023-05-28 NOTE — CONSULTS
Inpatient consult to Cardiology  Consult performed by: DAYA Cruz  Consult ordered by: [facility] Medical Telemetry  Cardiology  Consult Note    Patient Name: Fanny Armijo  MRN: [id number]  Admission Date: 5/27/2023  Hospital Length of Stay: 0 days  Code Status: Full Code   Attending Provider: Crescencio Novak MD   Consulting Provider: DAYA Cruz  Primary Care Physician: No primary care provider on file.  Principal Problem:<principal problem not specified>    Patient information was obtained from patient, past medical records, and ER records.     Subjective:     Chief Complaint: Reason for Consult: Labile HR    HPI: Ms. Armijo is an 81 y/o female who is unknown to [facility]. The patient presented to [facility] on 5.27.23 with c/o Bilateral Lower Quadrant Pain that started on her date of presentation. She reported subjective Fever, Chills, and N/V x 4 Episodes. The pain has since radiated to her Epigastric Area. Initial Workup revealed Temp 102.2, HR 35, BP 100s/60s, Lactic Acid 2.5, UA with Estrace, Nitrites and OCB 2+, WBC 18.8 and a HR in the 40s-50s. She also had a CT which revealed Moderated L Hydroureteronephrosis due to 11 mm obstructing calculus in the Distal Ureter with Inflammatory Stranding around the L Kidney and several areas of diminished Cortical enhancement with Pyelonephritis. She was started on IV Abx and admitted to Hospital Medicine with a Urology Consult. [facility] has also been consulted for Bradycardia.     PMH: Diverticulitis, Nephrolithiasis  PSH: CELESTE, Colonoscopy, Cholecystectomy, Kidney Stenting, Lithotripsy   Family History: Denies Family History of Heart Disease  Social History: Denies Illicit Drug, ETOH and Tobacco Use    Previous Cardiac Diagnostics: None    Review of patient's allergies indicates:  No Known Allergies    No current facility-administered medications on file prior to encounter.     No current outpatient  medications on file prior to encounter.     Review of Systems   Constitutional:  Positive for fatigue and fever.   Respiratory:  Negative for shortness of breath.    Cardiovascular:  Negative for chest pain, palpitations and leg swelling.   Genitourinary:  Positive for dysuria, flank pain and pelvic pain.   All other systems reviewed and are negative.    Objective:     Vital Signs (Most Recent):  Temp: 98.2 °F (36.8 °C) (05/28/23 0921)  Pulse: 101 (05/28/23 0921)  Resp: 20 (05/28/23 0921)  BP: 103/62 (05/28/23 0921)  SpO2: (!) 93 % (05/28/23 0921) Vital Signs (24h Range):  Temp:  [97.3 °F (36.3 °C)-102.2 °F (39 °C)] 98.2 °F (36.8 °C)  Pulse:  [] 101  Resp:  [16-28] 20  SpO2:  [84 %-96 %] 93 %  BP: ()/(50-70) 103/62     Weight: 59.9 kg (132 lb)  Body mass index is 21.97 kg/m².    SpO2: (!) 93 %         Intake/Output Summary (Last 24 hours) at 5/28/2023 1026  Last data filed at 5/28/2023 0652  Gross per 24 hour   Intake --   Output 590 ml   Net -590 ml     Lines/Drains/Airways       Peripheral Intravenous Line  Duration                  Peripheral IV - Single Lumen 05/27/23 20 G Posterior;Right Hand 1 day         Peripheral IV - Single Lumen 05/28/23 0046 20 G Right Forearm <1 day                  Significant Labs:  Recent Results (from the past 72 hour(s))   Comprehensive metabolic panel    Collection Time: 05/27/23 10:36 PM   Result Value Ref Range    Sodium Level 139 136 - 145 mmol/L    Potassium Level 3.0 (L) 3.5 - 5.1 mmol/L    Chloride 108 (H) 98 - 107 mmol/L    Carbon Dioxide 21 (L) 23 - 31 mmol/L    Glucose Level 121 (H) 82 - 115 mg/dL    Blood Urea Nitrogen 21.8 (H) 9.8 - 20.1 mg/dL    Creatinine 1.07 (H) 0.55 - 1.02 mg/dL    Calcium Level Total 8.8 8.4 - 10.2 mg/dL    Protein Total 6.1 5.8 - 7.6 gm/dL    Albumin Level 3.4 3.4 - 4.8 g/dL    Globulin 2.7 2.4 - 3.5 gm/dL    Albumin/Globulin Ratio 1.3 1.1 - 2.0 ratio    Bilirubin Total 0.6 <=1.5 mg/dL    Alkaline Phosphatase 88 40 - 150 unit/L     Alanine Aminotransferase 12 0 - 55 unit/L    Aspartate Aminotransferase 27 5 - 34 unit/L    eGFR 52 mls/min/1.73/m2   Lactic acid, plasma    Collection Time: 05/27/23 10:36 PM   Result Value Ref Range    Lactic Acid Level 2.5 (H) 0.5 - 2.2 mmol/L   CBC with Differential    Collection Time: 05/27/23 10:36 PM   Result Value Ref Range    WBC 18.80 (H) 4.50 - 11.50 x10(3)/mcL    RBC 4.20 4.20 - 5.40 x10(6)/mcL    Hgb 13.0 12.0 - 16.0 g/dL    Hct 39.5 37.0 - 47.0 %    MCV 94.0 80.0 - 94.0 fL    MCH 31.0 27.0 - 31.0 pg    MCHC 32.9 (L) 33.0 - 36.0 g/dL    RDW 12.9 11.5 - 17.0 %    Platelet 237 130 - 400 x10(3)/mcL    MPV 9.1 7.4 - 10.4 fL    Neut % 95.7 %    Lymph % 1.9 %    Mono % 1.0 %    Eos % 0.1 %    Basophil % 0.4 %    Lymph # 0.36 (L) 0.6 - 4.6 x10(3)/mcL    Neut # 18.01 (H) 2.1 - 9.2 x10(3)/mcL    Mono # 0.18 0.1 - 1.3 x10(3)/mcL    Eos # 0.01 0 - 0.9 x10(3)/mcL    Baso # 0.07 <=0.2 x10(3)/mcL    IG# 0.17 (H) 0 - 0.04 x10(3)/mcL    IG% 0.9 %    NRBC% 0.0 %   Urinalysis, Reflex to Urine Culture    Collection Time: 05/28/23 12:40 AM    Specimen: Urine   Result Value Ref Range    Color, UA Yellow Yellow, Light-Yellow, Dark Yellow, Carri, Straw    Appearance, UA Clear Clear    Specific Gravity, UA 1.013 1.005 - 1.030    pH, UA 5.5 5.0 - 8.5    Protein, UA 1+ (A) Negative mg/dL    Glucose, UA Trace (A) Negative, Normal mg/dL    Ketones, UA Negative Negative mg/dL    Blood, UA 2+ (A) Negative unit/L    Bilirubin, UA Negative Negative mg/dL    Urobilinogen, UA 0.2 0.2, 1.0, Normal mg/dL    Nitrites, UA Positive (A) Negative    Leukocyte Esterase, UA Trace (A) Negative unit/L   Urinalysis, Microscopic    Collection Time: 05/28/23 12:40 AM   Result Value Ref Range    RBC, UA <5 <=5 /HPF    WBC, UA <5 <=5 /HPF    Squamous Epithelial Cells, UA <5 <=5 /HPF    Bacteria, UA 4+ (A) None Seen, Rare, Occasional /HPF   Lactic Acid, Plasma    Collection Time: 05/28/23 12:45 AM   Result Value Ref Range    Lactic Acid Level 2.5 (H)  0.5 - 2.2 mmol/L   Comprehensive Metabolic Panel (CMP)    Collection Time: 05/28/23  6:41 AM   Result Value Ref Range    Sodium Level 139 136 - 145 mmol/L    Potassium Level 3.4 (L) 3.5 - 5.1 mmol/L    Chloride 109 (H) 98 - 107 mmol/L    Carbon Dioxide 24 23 - 31 mmol/L    Glucose Level 165 (H) 82 - 115 mg/dL    Blood Urea Nitrogen 19.8 9.8 - 20.1 mg/dL    Creatinine 1.21 (H) 0.55 - 1.02 mg/dL    Calcium Level Total 9.1 8.4 - 10.2 mg/dL    Protein Total 5.7 (L) 5.8 - 7.6 gm/dL    Albumin Level 2.8 (L) 3.4 - 4.8 g/dL    Globulin 2.9 2.4 - 3.5 gm/dL    Albumin/Globulin Ratio 1.0 (L) 1.1 - 2.0 ratio    Bilirubin Total 0.6 <=1.5 mg/dL    Alkaline Phosphatase 65 40 - 150 unit/L    Alanine Aminotransferase 14 0 - 55 unit/L    Aspartate Aminotransferase 26 5 - 34 unit/L    eGFR 45 mls/min/1.73/m2   Magnesium    Collection Time: 05/28/23  6:41 AM   Result Value Ref Range    Magnesium Level 1.70 1.60 - 2.60 mg/dL   Phosphorus    Collection Time: 05/28/23  6:41 AM   Result Value Ref Range    Phosphorus Level 2.9 2.3 - 4.7 mg/dL   CBC with Differential    Collection Time: 05/28/23  6:41 AM   Result Value Ref Range    WBC 31.23 (H) 4.50 - 11.50 x10(3)/mcL    RBC 3.62 (L) 4.20 - 5.40 x10(6)/mcL    Hgb 11.3 (L) 12.0 - 16.0 g/dL    Hct 34.7 (L) 37.0 - 47.0 %    MCV 95.9 (H) 80.0 - 94.0 fL    MCH 31.2 (H) 27.0 - 31.0 pg    MCHC 32.6 (L) 33.0 - 36.0 g/dL    RDW 13.2 11.5 - 17.0 %    Platelet 207 130 - 400 x10(3)/mcL    MPV 9.1 7.4 - 10.4 fL    NRBC% 0.0 %   Manual Differential    Collection Time: 05/28/23  6:41 AM   Result Value Ref Range    Neut Man 96 %    Lymph Man 4 %    Basophil Man 1 %    Instr WBC 31.23 x10(3)/mcL    Abs Baso 0.3123 (H) 0 - 0.2 x10(3)/mcL    Abs Lymp 1.2492 0.6 - 4.6 x10(3)/mcL    Abs Neut 29.9808 (H) 2.1 - 9.2 x10(3)/mcL    RBC Morph Normal Normal    Platelet Est Normal Normal, Adequate     Significant Imaging:  Imaging Results              CT Chest Abdomen Pelvis With Contrast (xpd) (Final result)  Result  time 05/28/23 09:32:08   Procedure changed from CT Abdomen Pelvis With Contrast     Final result by Jeb Wetzel MD (05/28/23 09:32:08)                   Impression:      1. Distal left ureteral 9-10 mm calculus with moderate obstructive features.  Additionally there is prominent left perinephric stranding with heterogeneous nephrogram, question pyelonephritis.  2. No acute thoracic findings.  3.  No significant discrepancy with the preliminary report.      Electronically signed by: Jeb Wetzel  Date:    05/28/2023  Time:    09:32               Narrative:    EXAMINATION:  CT CHEST ABDOMEN PELVIS WITH CONTRAST (XPD)    CLINICAL HISTORY:  Sepsis;    TECHNIQUE:  Helical acquisition from the thoracic inlet through the ischia with  IV contrast. Three plane reconstructions made available for review.  mGycm. Automatic exposure control, adjustment of mA/kV or iterative reconstruction technique was used to reduce radiation.    COMPARISON:  18 December 2017    FINDINGS:  Chest.    Heart is not enlarged.  No pericardial effusion.    No mediastinal, hilar or axillary adenopathy by CT size criteria.    No pleural effusion.  Mild chronic changes of the lungs.  No opacities suspicious for pneumonia.    Abdomen and pelvis.    Stable appearance of the liver.  Patent portal vein.  There is a calcified gallstone.  No pericholecystic inflammation.  No acute findings spleen, pancreas or adrenals.    There is a distal left ureteral 9-10 mm calculus image 103 series 2.  Moderate associated hydronephrosis with prominent left perinephric stranding.  Heterogeneous nephrogram left kidney.    No bowel obstruction.  There is colonic diverticulosis.  No significant inflammatory changes of the bowel.    Urinary bladder is unremarkable.  No pelvic free fluid.  Abdominal aorta normal in caliber.  Moderate atherosclerotic disease.    No acute osseous findings.                        Preliminary result by Jeb Wetzel MD (05/27/23  23:29:32)                   Narrative:    START OF REPORT:  Technique: CT Scan of the chest abdomen and pelvis was performed with intravenous contrast with axial as well as sagittal and, coronal images.    Dosage Information: Automated Exposure Control was utilized.    Comparison: Comparison is with study dated â2017-12-18 08:16:58â.    Clinical History: Sepsis.    Findings:  Neck: The visualized soft tissues of the neck appear unremarkable. The thyroid gland appear unremarkable.  Mediastinum: The mediastinal structures are within normal limits.  Heart: The heart size is within normal limits.  Aorta: Unremarkable appearing aorta. No aortic dissection or aneurysm is seen.  Pulmonary Arteries: No filling defects are seen in the pulmonary arteries to suggest pulmonary embolus to the sensitivity of the study.  Lungs: There is mild non specific dependent change at the lung bases. Otherwise clear lungs with no focal infiltrate or consolidation.  Pleura: No effusions or pneumothorax are identified.  Bony Structures:  Spine: Moderate multilevel spondylolytic changes are seen in the thoracic spine.  Ribs: An old healed fracture at the anterior end of right 5th rib .No acute rib fractures are identified.  Abdomen:  Abdominal Wall: No abdominal wall pathology is seen.  Liver: Stable 5 cm simple cyst in the left lobe of liver. The liver otherwise appears unremarkable.  Biliary System: No intrahepatic or extrahepatic biliary duct dilatation is seen.  Gallbladder: A single gallstone is seen in the gallbladder which otherwise appears unremarkable.  Pancreas: There may be some calcifications in the tail of the pancreas which may reflect an element of chronic pancreatitis however the pancreas otherwise appears unremarkable.  Spleen: The spleen appears unremarkable.  Adrenals: The adrenal glands appear hyperplastic bilaterally.  Kidneys: The right kidney appears unremarkable with no stones masses or hydronephrosis with a few  scattered small cysts. There is moderate left hydroureteronephrosis due to an 11 mm obstructive calculus in the distal ureter seen on series 10,image 58 series 2 image 103. There is inflammatory stranding around the left kidney and there may be several areas of diminished cortical enhancement such that an element of pyelonephritis is a consideration.  Aorta: The abdominal aorta appears unremarkable.  IVC: Unremarkable.  Bowel:  Esophagus: The visualized esophagus appears unremarkable.  Stomach: The stomach appears unremarkable.  Duodenum: Unremarkable appearing duodenum.  Small Bowel: The small bowel appears unremarkable.  Colon: There is moderate stool in the ascending descending and sigmoid colon which could reflect an element of constipation. Multiple scattered diverticula are seen predominantly in the descending and sigmoid colon. No associated inflammatory stranding is seen to suggest diverticulitis.  Appendix: The appendix is not identified but no inflammatory changes are seen in the right lower quadrant to suggest appendicitis.  Peritoneum: No intraperitoneal free air or ascites is seen.    Pelvis:  Bladder: The bladder appears overtly distended with multiple diverticulae along its lateral walls. This raises possibility of atonic bladder.  Female:  Uterus: The uterus is not identified consistent with history of hysterectomy.  Ovaries: No adnexal masses are seen.    Bony structures: Generalised osteopenia of visualised bones is noted.  Dorsal Spine: There is moderate multilevel spondylosis of the visualized dorsal spine. Chronic stable wedge compression fractures of L2 and L4 vertebrae are noted.  Bony Pelvis: The visualized bony structures of the pelvis appear unremarkable.      Impression:  1. There is moderate left hydroureteronephrosis due to an 11 mm obstructive calculus in the distal ureter seen on series 10,image 58 series 2 image 103. There is inflammatory stranding around the left kidney and there may  be several areas of diminished cortical enhancement such that an element of pyelonephritis is a consideration. Correlate with clinical and laboratory findings as regards additional evaluation and follow-up.  2. No acute intrathoracic pathology identified. Details and other findings as discussed above.                                         X-Ray Chest AP Portable (In process)                   EKG:      Telemetry: Bradycardia    Physical Exam  Constitutional:       Appearance: Normal appearance.   HENT:      Head: Normocephalic.      Mouth/Throat:      Mouth: Mucous membranes are moist.   Eyes:      Extraocular Movements: Extraocular movements intact.   Cardiovascular:      Rate and Rhythm: Normal rate and regular rhythm.      Pulses: Normal pulses.   Pulmonary:      Effort: Pulmonary effort is normal.      Breath sounds: Normal breath sounds.   Abdominal:      Palpations: Abdomen is soft.   Musculoskeletal:         General: No swelling. Normal range of motion.   Skin:     General: Skin is warm and dry.   Neurological:      General: No focal deficit present.      Mental Status: She is alert and oriented to person, place, and time.   Psychiatric:         Mood and Affect: Mood normal.         Behavior: Behavior normal.     Home Medications:   No current facility-administered medications on file prior to encounter.     No current outpatient medications on file prior to encounter.     Current Inpatient Medications:    Current Facility-Administered Medications:     acetaminophen tablet 650 mg, 650 mg, Oral, Q6H PRN, SARAH JainCNP-BC    aluminum-magnesium hydroxide-simethicone 200-200-20 mg/5 mL suspension 30 mL, 30 mL, Oral, QID PRN, Tamera Trent AGACNP-BC    enoxaparin injection 40 mg, 40 mg, Subcutaneous, Daily, SARAH JainCNP-BC    lactated ringers infusion, , Intravenous, Continuous, PONCE Jain-BC, Last Rate: 75 mL/hr at 05/28/23 0437, New Bag at 05/28/23 0437    melatonin tablet  6 mg, 6 mg, Oral, Nightly PRN, SRIDHAR JainP-BC    naloxone 0.4 mg/mL injection 0.02 mg, 0.02 mg, Intravenous, PRN, Tamera Trent AGACNP-BC    piperacillin-tazobactam (ZOSYN) 4.5 g in dextrose 5 % in water (D5W) 5 % 100 mL IVPB (MB+), 4.5 g, Intravenous, Q8H, PONCE Jain-BC, Last Rate: 25 mL/hr at 05/28/23 0631, 4.5 g at 05/28/23 0631    polyethylene glycol packet 17 g, 17 g, Oral, BID PRN, SARAH JainCNP-BC    simethicone chewable tablet 80 mg, 1 tablet, Oral, QID PRN, Tamera Trent AGACNP-BC    VTE Risk Mitigation (From admission, onward)           Ordered     enoxaparin injection 40 mg  Daily         05/28/23 0337     IP VTE HIGH RISK PATIENT  Once         05/28/23 0337     Place sequential compression device  Until discontinued         05/28/23 0337                  Assessment:   Sepsis secondary to L Sided Pyelonephritis  Renal Caliculi - 11mm Obstructing Ureter Sone with Moderate L Hydroureteronephrosis s/p Double J Stent Placements  Electrolyte Derangements - Hypokalemia, Hypomagnesemia  Sinus Bradycardia - Labile  Hx of Diverticulitis  No Hx of GIB     Plan:   Hold All AV Peri Blocking Agents and Rate Lowering Medications  EKG when PT is Bradycardic  ECHO Today  Continue to Monitor on Telemetry  Consider OP MCT to Evaluate for Bradycardia  TSH Now  Keep K > 4.0 and Mg > 2.0  Replete Lytes  Labs and EKG in AM: CBC, CMP and Mg    Thank you for your consult.     DAYA Cruz  Cardiology  [facility] - 6th Floor Medical Telemetry  05/28/2023 10:26 AM

## 2023-05-28 NOTE — ANESTHESIA PROCEDURE NOTES
Intubation    Date/Time: 5/28/2023 2:08 AM  Performed by: Sanjeev Hummel CRNA  Authorized by: Anel Cardoso MD     Intubation:     Induction:  Rapid sequence induction    Intubated:  Postinduction    Mask Ventilation:  N/a    Attempts:  1    Attempted By:  CRNA    Method of Intubation:  Direct    Blade:  Arceo 2    Laryngeal View Grade: Grade I - full view of cords      Difficult Airway Encountered?: No      Complications:  None    Airway Device:  Oral endotracheal tube    Airway Device Size:  7.0    Style/Cuff Inflation:  Cuffed    Inflation Amount (mL):  5    Tube secured:  21    Secured at:  The lips    Placement Verified By:  Capnometry    Complicating Factors:  None    Findings Post-Intubation:  BS equal bilateral and atraumatic/condition of teeth unchanged

## 2023-05-28 NOTE — NURSING
Nurses Note -- 4 Eyes      5/28/2023   4:13 AM      Skin assessed during: Admit      [x] No Altered Skin Integrity Present    [x]Prevention Measures Documented      [] Yes- Altered Skin Integrity Present or Discovered   [] LDA Added if Not in Epic (Describe Wound)   [] New Altered Skin Integrity was Present on Admit and Documented in LDA   [] Wound Image Taken    Wound Care Consulted? No    Attending Nurse:  Elham Brar RN     Second RN/Staff Member:  Maritza Russ RN

## 2023-05-28 NOTE — CONSULTS
[facility]                       [address] [place] [id number]    PATIENT NAME:       CLEMENT BOLANOS  YOB: 1940  CSN:                [id number]   MRN:                [id number]  ADMIT DATE:         05/27/2023 21:50:00  PHYSICIAN:          Juan Casillas MD                            CONSULTATION    DATE OF CONSULT:      HISTORY OF PRESENT ILLNESS:  The patient is an 82-year-old female who has seen   Dr. Thania Bolden in the past for urolithiasis.  She presents with left   flank pain/left lower quadrant discomfort.  Pain began approximately 2 p.m.   today.  The patient has had nausea, vomiting, fevers and chills.  She was   hypotensive on arrival to the emergency department.  She denies shortness of   breath, cough, congestion.  She has a history of irritable bowel, which seems to   be under control (she sees Dr. Dooley).  CT scan was performed which   documents moderate to severe left hydronephrosis with perinephric stranding.  CT   was a contrasted scan, but there appears to be a very large stone in the distal   ureter (1.1 cm).    PAST MEDICAL HISTORY:  Irritable bowel.    PHYSICAL EXAMINATION:  GENERAL:  The patient is well appearing, comfortable, no acute distress.  ABDOMEN:  Soft, nontender, nondistended.  Flanks nontender in the preop holding   area.  On admission, she did have severe tenderness in the left lower quadrant.    ALLERGIES:  NO KNOWN DRUG ALLERGIES.     LABORATORY DATA:  Labs on admission, BUN 21, creatinine 1.0.  Urine:  Nitrite   positive, leukocyte esterase positive.  Lactic acid level 2.5.  White count   18,000.  CT scan as described above.    ASSESSMENT AND PLAN:  Hypotension with a large left distal ureteral calculus.    Spoke with the patient, reviewed her films.  I believe she needs a left ureteral   stent.  Risks and benefits of this procedure were clearly outlined.   As this   stone is quite large, she was advised that it may be impossible to place a   stent.  If we are not able to place a stent, she will need a left percutaneous   nephroscopy.  This was explained to the patient.  Risks and benefits of surgery   were clearly outlined.  We will proceed this morning.        ______________________________  MD VIKASH Herrera/AQS  DD:  05/28/2023  Time:  02:10AM  DT:  05/28/2023  Time:  03:12AM  Job #:  [id number]      CONSULTATION

## 2023-05-28 NOTE — ANESTHESIA POSTPROCEDURE EVALUATION
Anesthesia Post Evaluation    Patient: Fanny Armijo    Procedure(s) Performed: Procedure(s) (LRB):  CYSTOSCOPY, WITH URETERAL STENT INSERTION (N/A)    Final Anesthesia Type: general      Patient location during evaluation: PACU  Patient participation: Yes- Able to Participate  Level of consciousness: awake and alert  Post-procedure vital signs: reviewed and stable  Pain management: adequate  Airway patency: patent      Anesthetic complications: no      Cardiovascular status: hemodynamically stable  Respiratory status: unassisted  Hydration status: euvolemic  Follow-up not needed.          Vitals Value Taken Time   /57 05/28/23 0334   Temp 36.3 °C (97.3 °F) 05/28/23 0334   Pulse 98 05/28/23 0334   Resp 20 05/28/23 0334   SpO2 90 % 05/28/23 0334         Event Time   Out of Recovery 05/28/2023 03:25:00         Pain/Myron Score: Pain Rating Prior to Med Admin: 4 (5/27/2023 10:53 PM)  Pain Rating Post Med Admin: 3 (5/28/2023 12:48 AM)  Myron Score: 10 (5/28/2023  3:25 AM)

## 2023-05-28 NOTE — ANESTHESIA PREPROCEDURE EVALUATION
05/28/2023  Fanny Armijo is a 82 y.o., female pmhx of nephrolithiasis, diverticulitis and colitis  presents to ED via EMS for abdominal pain onset 1400 today. Pt reports that pain is in her left abdomen. Pt reports additional symptoms of nausea, vomiting, fever and chills. Pt states that she has taken tylenol for her abdominal pain to no relief. Pt reports that she had a normal BM earlier today. Pt denies SOB, cough, and congestion. Per EMS pt was sating 92% on room air.   Left hydroneph with obstructing stone  Febrile  Wbc 18,800  Cr 1.07  Pre-op Assessment    I have reviewed the Patient Summary Reports.     I have reviewed the Nursing Notes. I have reviewed the NPO Status.   I have reviewed the Medications.     Review of Systems  Anesthesia Hx:  HR all over the place last surgery (laser litho)- not A-fib per pt   Social:  Non-Smoker        Physical Exam  General: Well nourished, Cooperative, Alert and Oriented    Airway:  Mallampati: II   Mouth Opening: Normal  TM Distance: Normal  Tongue: Normal  Neck ROM: Normal ROM    Dental:  Intact        Anesthesia Plan  Type of Anesthesia, risks & benefits discussed:    Anesthesia Type: Gen ETT  Intra-op Monitoring Plan: Standard ASA Monitors  Post Op Pain Control Plan: multimodal analgesia  Induction:  IV and rapid sequence  Airway Plan: Direct, Post-Induction  Informed Consent: Informed consent signed with the Patient and all parties understand the risks and agree with anesthesia plan.  All questions answered. Patient consented to blood products? Yes  ASA Score: 2  Day of Surgery Review of History & Physical: H&P Update referred to the surgeon/provider.    Ready For Surgery From Anesthesia Perspective.     .

## 2023-05-29 LAB
ACINETOBACTER CALCOACETICUS-BAUMANNII COMPLEX (OHS): NOT DETECTED
ALBUMIN SERPL-MCNC: 2.5 G/DL (ref 3.4–4.8)
ALBUMIN/GLOB SERPL: 1 RATIO (ref 1.1–2)
ALP SERPL-CCNC: 58 UNIT/L (ref 40–150)
ALT SERPL-CCNC: 13 UNIT/L (ref 0–55)
AST SERPL-CCNC: 27 UNIT/L (ref 5–34)
BACTEROIDES FRAGILIS (OHS): NOT DETECTED
BASOPHILS # BLD AUTO: 0.06 X10(3)/MCL
BASOPHILS NFR BLD AUTO: 0.3 %
BILIRUBIN DIRECT+TOT PNL SERPL-MCNC: 0.6 MG/DL
BUN SERPL-MCNC: 20.2 MG/DL (ref 9.8–20.1)
C AURIS DNA BLD POS QL NAA+NON-PROBE: NOT DETECTED
C GATTII+NEOFOR DNA CSF QL NAA+NON-PROBE: NOT DETECTED
CALCIUM SERPL-MCNC: 8 MG/DL (ref 8.4–10.2)
CANDIDA ALBICANS (OHS): NOT DETECTED
CANDIDA GLABRATA (OHS): NOT DETECTED
CANDIDA KRUSEI (OHS): NOT DETECTED
CANDIDA PARAPSILOSIS (OHS): NOT DETECTED
CANDIDA TROPICALIS (OHS): NOT DETECTED
CHLORIDE SERPL-SCNC: 107 MMOL/L (ref 98–107)
CO2 SERPL-SCNC: 24 MMOL/L (ref 23–31)
CREAT SERPL-MCNC: 1.03 MG/DL (ref 0.55–1.02)
CTX-M (OHS): NOT DETECTED
ENTEROBACTER CLOACAE COMPLEX (OHS): NOT DETECTED
ENTEROBACTERALES (OHS): DETECTED
ENTEROCOCCUS FAECALIS (OHS): NOT DETECTED
ENTEROCOCCUS FAECIUM (OHS): NOT DETECTED
EOSINOPHIL # BLD AUTO: 0.21 X10(3)/MCL (ref 0–0.9)
EOSINOPHIL NFR BLD AUTO: 1 %
ERYTHROCYTE [DISTWIDTH] IN BLOOD BY AUTOMATED COUNT: 13.5 % (ref 11.5–17)
ESCHERICHIA COLI (OHS): DETECTED
GFR SERPLBLD CREATININE-BSD FMLA CKD-EPI: 54 MLS/MIN/1.73/M2
GLOBULIN SER-MCNC: 2.6 GM/DL (ref 2.4–3.5)
GLUCOSE SERPL-MCNC: 141 MG/DL (ref 82–115)
GP B STREP DNA CSF QL NAA+NON-PROBE: NOT DETECTED
HAEM INFLU DNA CSF QL NAA+NON-PROBE: NOT DETECTED
HCT VFR BLD AUTO: 33.5 % (ref 37–47)
HGB BLD-MCNC: 10.8 G/DL (ref 12–16)
IMM GRANULOCYTES # BLD AUTO: 0.44 X10(3)/MCL (ref 0–0.04)
IMM GRANULOCYTES NFR BLD AUTO: 2.1 %
IMP (OHS): NOT DETECTED
KLEBSIELLA AEROGENES (OHS): NOT DETECTED
KLEBSIELLA OXYTOCA (OHS): NOT DETECTED
KLEBSIELLA PNEUMONIAE GROUP (OHS): NOT DETECTED
KPC (OHS): NOT DETECTED
L MONOCYTOG DNA CSF QL NAA+NON-PROBE: NOT DETECTED
LYMPHOCYTES # BLD AUTO: 1.4 X10(3)/MCL (ref 0.6–4.6)
LYMPHOCYTES NFR BLD AUTO: 6.8 %
MAGNESIUM SERPL-MCNC: 2.2 MG/DL (ref 1.6–2.6)
MCH RBC QN AUTO: 31.1 PG (ref 27–31)
MCHC RBC AUTO-ENTMCNC: 32.2 G/DL (ref 33–36)
MCR-1 (OHS): NOT DETECTED
MCV RBC AUTO: 96.5 FL (ref 80–94)
MECA/C (OHS): ABNORMAL
MECA/C AND MREJ (MRSA)(OHS): ABNORMAL
MONOCYTES # BLD AUTO: 1.07 X10(3)/MCL (ref 0.1–1.3)
MONOCYTES NFR BLD AUTO: 5.2 %
N MEN DNA CSF QL NAA+NON-PROBE: NOT DETECTED
NDM (OHS): NOT DETECTED
NEUTROPHILS # BLD AUTO: 17.3 X10(3)/MCL (ref 2.1–9.2)
NEUTROPHILS NFR BLD AUTO: 84.6 %
NRBC BLD AUTO-RTO: 0 %
OXA-48-LIKE (OHS): NOT DETECTED
PLATELET # BLD AUTO: 191 X10(3)/MCL (ref 130–400)
PMV BLD AUTO: 9.7 FL (ref 7.4–10.4)
POTASSIUM SERPL-SCNC: 4 MMOL/L (ref 3.5–5.1)
PROT SERPL-MCNC: 5.1 GM/DL (ref 5.8–7.6)
PROTEUS SPP. (OHS): NOT DETECTED
PSEUDOMONAS AERUGINOSA (OHS): NOT DETECTED
RBC # BLD AUTO: 3.47 X10(6)/MCL (ref 4.2–5.4)
S ENT+BONG DNA STL QL NAA+NON-PROBE: NOT DETECTED
S PNEUM DNA CSF QL NAA+NON-PROBE: NOT DETECTED
SERRATIA MARCESCENS (OHS): NOT DETECTED
SODIUM SERPL-SCNC: 137 MMOL/L (ref 136–145)
STAPHYLOCOCCUS AUREUS (OHS): NOT DETECTED
STAPHYLOCOCCUS EPIDERMIDIS (OHS): NOT DETECTED
STAPHYLOCOCCUS LUGDUNENSIS (OHS): NOT DETECTED
STAPHYLOCOCCUS SPP. (OHS): NOT DETECTED
STENOTROPHOMONAS MALTOPHILIA (OHS): NOT DETECTED
STREPTOCOCCUS PYOGENES (GROUP A)(OHS): NOT DETECTED
STREPTOCOCCUS SPP. (OHS): NOT DETECTED
VANA/B (OHS): ABNORMAL
VIM (OHS): NOT DETECTED
WBC # SPEC AUTO: 20.48 X10(3)/MCL (ref 4.5–11.5)

## 2023-05-29 PROCEDURE — 25000003 PHARM REV CODE 250: Performed by: NURSE PRACTITIONER

## 2023-05-29 PROCEDURE — 80053 COMPREHEN METABOLIC PANEL: CPT | Performed by: NURSE PRACTITIONER

## 2023-05-29 PROCEDURE — 85025 COMPLETE CBC W/AUTO DIFF WBC: CPT | Performed by: NURSE PRACTITIONER

## 2023-05-29 PROCEDURE — 94761 N-INVAS EAR/PLS OXIMETRY MLT: CPT

## 2023-05-29 PROCEDURE — 93005 ELECTROCARDIOGRAM TRACING: CPT

## 2023-05-29 PROCEDURE — 93010 EKG 12-LEAD: ICD-10-PCS | Mod: ,,, | Performed by: INTERNAL MEDICINE

## 2023-05-29 PROCEDURE — 83735 ASSAY OF MAGNESIUM: CPT | Performed by: NURSE PRACTITIONER

## 2023-05-29 PROCEDURE — 21400001 HC TELEMETRY ROOM

## 2023-05-29 PROCEDURE — 93010 ELECTROCARDIOGRAM REPORT: CPT | Mod: ,,, | Performed by: INTERNAL MEDICINE

## 2023-05-29 PROCEDURE — 97165 OT EVAL LOW COMPLEX 30 MIN: CPT

## 2023-05-29 PROCEDURE — 63600175 PHARM REV CODE 636 W HCPCS: Performed by: NURSE PRACTITIONER

## 2023-05-29 RX ADMIN — PIPERACILLIN AND TAZOBACTAM 4.5 G: 4; .5 INJECTION, POWDER, LYOPHILIZED, FOR SOLUTION INTRAVENOUS; PARENTERAL at 11:05

## 2023-05-29 RX ADMIN — PIPERACILLIN AND TAZOBACTAM 4.5 G: 4; .5 INJECTION, POWDER, LYOPHILIZED, FOR SOLUTION INTRAVENOUS; PARENTERAL at 02:05

## 2023-05-29 RX ADMIN — ENOXAPARIN SODIUM 40 MG: 40 INJECTION SUBCUTANEOUS at 04:05

## 2023-05-29 RX ADMIN — PIPERACILLIN AND TAZOBACTAM 4.5 G: 4; .5 INJECTION, POWDER, LYOPHILIZED, FOR SOLUTION INTRAVENOUS; PARENTERAL at 06:05

## 2023-05-29 NOTE — PT/OT/SLP EVAL
Occupational Therapy   Evaluation and Discharge Note    Name: Fanny Armijo  MRN: [id number]  Admitting Diagnosis: Ureterolithiasis  Recent Surgery: Procedure(s) (LRB):  CYSTOSCOPY, WITH URETERAL STENT INSERTION (N/A) 1 Day Post-Op    Recommendations:     Discharge Recommendations: home vs home with home health   (However, pt politely refused HH services at this time)  Discharge Equipment Recommendations: none  Barriers to discharge: none    Assessment:     Fanny Armijo is a 82 y.o. female with a medical diagnosis of Ureterolithiasis. At this time, patient is functioning at their prior level of function and does not require further acute OT services.     Plan:     During this hospitalization, patient does not require further acute OT services.  Please re-consult if situation changes.    Plan of Care Reviewed with: patient    Subjective     Chief Complaint: Pt denied any pain.  Patient/Family Comments/goals: Return home    Occupational Profile:  Living Environment: Lives alone with 5 cats (1 cat lives inside and remainder of cats live outside) in [facility] with 3 ROLANDO front entrance with R GB on door frame. Pt also owns 3 ROLANDO back entrance with R GB on door frame, but pt also owns a pipe (cemented into ground) located on R for additional support. Owns tub only with shower chair and GB located outside of tub (however, GB is not located where pt t/fs in/out of tub. Pt reported that she utilizes a shelf for support during t/f). Lastly, pt owns R toilet GB.  Previous level of function: Independent with ADL/IADLs (except neighbor assisted pt with yard work), and pt was driving.  Equipment Used at home: shower chair.   DME owned but not used: SC, QC, BSC, and RW (Note: BSC and RW are currently stored at separate building per pt)  Assistance upon Discharge: Pt reported that her 2 neighbors and friend can assist if needed.    Pain/Comfort:  Pain Rating 1: 0/10    Patients cultural, spiritual, Roman Catholic conflicts given the  current situation: no    Objective:     Communicated with: RN prior to session. Patient found ambulating in room with RN and telemetry upon OT entry to room.    General Precautions: Standard, fall  Respiratory Status: Room air     Occupational Performance:    Bed Mobility:    Patient completed Supine to Sit with independence  Patient t/f from seated EOB to lying with HOB elevated with independence    Functional Mobility/Transfers:  Patient completed Sit <> Stand Transfer with independence  with  no assistive device   Patient completed Bed > Chair Transfer using Step Transfer technique with independence with no assistive device  Patient completed Toilet Transfer with modified independence with grab bar  Pt ambulated from bathroom > EOB with Mod I, utilizing wall/furniture for support.  Functional Mobility: Pt performed long distance gait trial in hallway with no AD and SBA provided for safety. Pt demonstrated episodes of minor LOB during gait trial, but was able to self-correct. OT recommended use of QC post-d/c to increase pt's safety and decrease risk of fall. Pt verbalized good understanding.    Activities of Daily Living:  Lower Body Dressing: Pt doffed/donned B socks and slip-on shoes independently while EOB.    Cognitive/Visual Perceptual:  Cognitive/Psychosocial Skills:     -       Oriented to: Person, Place, Time, and Situation   -       Follows Commands/attention:Follows one-step commands and Follows two-step commands    Physical Exam:  Balance:    -       Slightly impaired dynamic standing balance with episodes of minor LOB during gait trial, but was able to self-correct. OT recommended use of QC post-d/c to increase pt's safety and decrease risk of fall. Pt verbalized good understanding.  Upper Extremity Range of Motion:     -       Right Upper Extremity: WFL  -       Left Upper Extremity: WFL  Upper Extremity Strength:    -       Right Upper Extremity: WFL  -       Left Upper Extremity: WFL    Therapeutic  Positioning  Risk for acquired pressure injuries is decreased due to ability to t/f to/from toilet with Mod I.    Skin assessment:    Findings: Bruises noted on BUE, bandage noted on RUE, and minor skin alteration noted in RUE 2/2 previous IV (RN aware)    OT recommendations for therapeutic positioning throughout hospitalization:   Follow [facility] Pressure Injury Prevention Protocol  Geomat recommended for sacral protection while UIC    Patient Education:  Patient provided with verbal education regarding DME recommendation with OT recommending use of QC post-d/c to increase pt's safety and decrease risk of fall. Pt verbalized good understanding.    Patient left up in chair with all lines intact, call button in reach, and RN aware.    GOALS:   Multidisciplinary Problems       Occupational Therapy Goals       Not on file                    History:     No past medical history on file.      Past Surgical History:   Procedure Laterality Date    CYSTOSCOPY W/ URETERAL STENT PLACEMENT N/A 5/28/2023    Procedure: CYSTOSCOPY, WITH URETERAL STENT INSERTION;  Surgeon: Juan Casillas MD;  Location: [facility];  Service: Urology;  Laterality: N/A;       Time Tracking:     OT Date of Treatment: 5/29/23  OT Start Time: 1451  OT Stop Time: 1525  OT Total Time (min): 34 min    Billable Minutes: Evaluation Low complexity 34 mins    5/29/2023

## 2023-05-29 NOTE — PLAN OF CARE
05/29/23 1442   Discharge Assessment   Assessment Type Discharge Planning Assessment   Confirmed/corrected address, phone number and insurance Yes   Confirmed Demographics Correct on Facesheet   Source of Information patient   Communicated ARIANA with patient/caregiver Date not available/Unable to determine   Reason For Admission Ureterolithiasis   People in Home alone  (Pt lives alone in a single story home with 3 steps to enter and hand  along the steps)   Do you expect to return to your current living situation? Yes   Do you have help at home or someone to help you manage your care at home? Yes   Who are your caregiver(s) and their phone number(s)? chetna Ray--[phone] and other neighbors next door will be able to assist pt upon dc   Prior to hospitilization cognitive status: Alert/Oriented   Current cognitive status: Alert/Oriented   Walking or Climbing Stairs   (independent with mobility)   Home Layout Able to live on 1st floor   Equipment Currently Used at Home none   Patient currently being followed by outpatient case management? No   Do you currently have service(s) that help you manage your care at home? No   Who is going to help you get home at discharge? neighborFlor   How do you get to doctors appointments? car, drives self   Discharge Plan A Home   Discharge Plan B Home   DME Needed Upon Discharge  none   Discharge Plan discussed with: Patient   Transition of Care Barriers None   Housing Stability   In the last 12 months, was there a time when you were not able to pay the mortgage or rent on time? N   Transportation Needs   In the past 12 months, has lack of transportation kept you from medical appointments or from getting medications? no   Food Insecurity   Within the past 12 months, you worried that your food would run out before you got the money to buy more. Never true     Pt does not have a PCP and does not think she will need one. She reports she has specialty doctors to take care  of her needs. Pt's  is her neighbor, Flor ([phone]). She has never had HH services. Pt was driving prior to hospital stay. She uses [facility] off of [address]. Pt has no dc needs at this time.

## 2023-05-29 NOTE — PROGRESS NOTES
[facility]  Hospital Medicine Progress Note        Chief Complaint: Inpatient follow-up on sepsis    HPI:   Fanny Armijo is a 82 y.o. White female with a past medical history of diverticulitis nephrolithiasis. The patient presented to [facility] on 5/27/2023 with a primary complaint of lower quadrant abdominal pain which began yesterday (05/27/2023).  She reports associated symptoms of chills, nausea and 4 episodes of vomiting.  Patient denies complaints of flank pain, chest pain, dizziness, weakness,. She is having epigastric pain since eating breakfast with burping.      Upon presentation to the ED, temperature a 102.2° F, heart rate 135, blood pressure 104/60, respiratory rate 20 and SpO2 96% on 2 L O2 via nasal cannula.  Heart rate intermittently dropping into the 40-50s. Labs with WBC 18.8, potassium 3.0, CO2 21, BUN/creatinine 21.8/1.07 (15/0.67 December 2017), lactic acid 2.5.  UA with less than 5 squamous epithelial cells, 4+ bacteria, trace leukocyte esterase, positive nitrites, 2+ occult blood, trace glucose 1+ protein.  Chest x-ray pending.  Preliminary report of CT abdomen pelvis and chest with contrast revealed moderate left hydroureteronephrosis due to an 11 mm obstructing calculus in the distal ureter with inflammatory stranding around the left kidney and several areas of diminished cortical enhancement so statin element of pyelonephritis is in consideration but no acute intrathoracic abnormalities.  In the ED patient received Tylenol and Zosyn.  Urology was consulted and patient was taken to the OR this morning (05/28/2023) for a left double-J stent placement.  Patient is admitted to hospital medicine services for further medical management.     Interval Hx:   Patient is sitting up in a chair.  No new issues have been reported.  Patient is afebrile, on room air, hemodynamically stable.  Hypotension has improved today.      Objective/physical exam:  General:  Elderly  white female in no acute distress  HENT: normocephalic, atraumatic  Eye: PERRL, EOMI, clear conjunctiva  Neck: full ROM, no thyromegaly, no JVD  Respiratory: clear to auscultation bilaterally  Cardiovascular: regular rate and rhythm  Gastrointestinal: non-distended, positive bowel sounds, non-tender  Musculoskeletal:  Generalized muscular atrophy  Integumentary:  Atrophic skin  Neurological: cranial nerves grossly intact, no focal neurological deficit  Psychiatric: cooperatives    VITAL SIGNS: 24 HRS MIN & MAX LAST   Temp  Min: 98.1 °F (36.7 °C)  Max: 99.3 °F (37.4 °C) 99.3 °F (37.4 °C)   BP  Min: 90/53  Max: 114/68 114/68   Pulse  Min: 47  Max: 93  78   Resp  Min: 18  Max: 20 18   SpO2  Min: 90 %  Max: 95 % (!) 94 %     I have reviewed the following labs:    Recent Labs   Lab 05/27/23 2236 05/28/23  0641 05/29/23  0423   WBC 18.80* 31.23* 20.48*   RBC 4.20 3.62* 3.47*   HGB 13.0 11.3* 10.8*   HCT 39.5 34.7* 33.5*   MCV 94.0 95.9* 96.5*   MCH 31.0 31.2* 31.1*   MCHC 32.9* 32.6* 32.2*   RDW 12.9 13.2 13.5    207 191   MPV 9.1 9.1 9.7       Recent Labs   Lab 05/27/23 2236 05/28/23  0641 05/29/23  0423    139 137   K 3.0* 3.4* 4.0   CO2 21* 24 24   BUN 21.8* 19.8 20.2*   CREATININE 1.07* 1.21* 1.03*   CALCIUM 8.8 9.1 8.0*   MG  --  1.70 2.20   ALBUMIN 3.4 2.8* 2.5*   ALKPHOS 88 65 58   ALT 12 14 13   AST 27 26 27   BILITOT 0.6 0.6 0.6          Microbiology Results (last 7 days)       Procedure Component Value Units Date/Time    BCID2 Panel [[id number]]  (Abnormal) Collected: 05/27/23 2236    Order Status: Completed Specimen: Blood from Arm, Right Updated: 05/29/23 0951     CTX-M (ESBL ) Not Detected     Comment: Note: Antimicrobial resistance can occur via multiple mechanisms. A Not Detected result for antimicrobial resistance gene(s) does not indicate antimicrobial susceptibility. Subculturing is required for species identification and susceptibility testing of   isolates.        IMP  (Cabapenemase ) Not Detected     Comment: Note: Antimicrobial resistance can occur via multiple mechanisms. A Not Detected result for antimicrobial resistance gene(s) does not indicate antimicrobial susceptibility. Subculturing is required for species identification and susceptibility testing of   isolates.        KPC resistance gene (Carbapenemase ) Not Detected     Comment: Note: Antimicrobial resistance can occur via multiple mechanisms. A Not Detected result for antimicrobial resistance gene(s) does not indicate antimicrobial susceptibility. Subculturing is required for species identification and susceptibility testing of   isolates.        mcr-1 Not Detected     Comment: Note: Antimicrobial resistance can occur via multiple mechanisms. A Not Detected result for antimicrobial resistance gene(s) does not indicate antimicrobial susceptibility. Subculturing is required for species identification and susceptibility testing of   isolates.        mecA ID N/A     Comment: Note: Antimicrobial resistance can occur via multiple mechanisms. A Not Detected result for antimicrobial resistance gene(s) does not indicate antimicrobial susceptibility. Subculturing is required for species identification and susceptibility testing of   isolates.        mecA/C and MREJ (MRSA) gene N/A     Comment: Note: Antimicrobial resistance can occur via multiple mechanisms. A Not Detected result for antimicrobial resistance gene(s) does not indicate antimicrobial susceptibility. Subculturing is required for species identification and susceptibility testing of   isolates.        NDM (Carbapenemase ) Not Detected     Comment: Note: Antimicrobial resistance can occur via multiple mechanisms. A Not Detected result for antimicrobial resistance gene(s) does not indicate antimicrobial susceptibility. Subculturing is required for species identification and susceptibility testing of   isolates.        OXA-48-like (Carbapenemase  ) Not Detected     Comment: Note: Antimicrobial resistance can occur via multiple mechanisms. A Not Detected result for antimicrobial resistance gene(s) does not indicate antimicrobial susceptibility. Subculturing is required for species identification and susceptibility testing of   isolates.        Hermilo/B (VRE gene) N/A     Comment: Note: Antimicrobial resistance can occur via multiple mechanisms. A Not Detected result for antimicrobial resistance gene(s) does not indicate antimicrobial susceptibility. Subculturing is required for species identification and susceptibility testing of   isolates.        VIM (Carbapenemase ) Not Detected     Comment: Note: Antimicrobial resistance can occur via multiple mechanisms. A Not Detected result for antimicrobial resistance gene(s) does not indicate antimicrobial susceptibility. Subculturing is required for species identification and susceptibility testing of   isolates.        Enterococcus faecalis Not Detected     Enterococcus faecium Not Detected     Listeria monocytogenes Not Detected     Staphylococcus spp. Not Detected     Staphylococcus aureus Not Detected     Staphylococcus epidermidis Not Detected     Staphylococcus lugdunensis Not Detected     Streptococcus spp. Not Detected     Streptococcus agalactiae (Group B) Not Detected     Streptococcus pneumoniae Not Detected     Streptococcus pyogenes (Group A) Not Detected     Acinetobacter calcoaceticus/baumannii complex Not Detected     Bacteroides fragilis Not Detected     Enterobacterales Detected     Enterobacter cloacae complex Not Detected     Escherichia coli Detected     Klebsiella aerogenes Not Detected     Klebsiella oxytoca Not Detected     Klebsiella pneumoniae group Not Detected     Proteus spp. Not Detected     Salmonella spp. Not Detected     Serratia marcescens Not Detected     Haemophilus influenzae Not Detected     Neisseria meningitidis Not Detected     Pseudomonas aeruginosa Not  Detected     Stenotrophomonas maltophilia Not Detected     Candida albicans Not Detected     Candida auris Not Detected     Candida glabrata Not Detected     Candida krusei Not Detected     Candida parapsilosis Not Detected     Candida tropicalis Not Detected     Cryptococcus neoformans/gattii Not Detected    Narrative:      The [employer] BCID2 Panel is a multiplexed nucleic acid test intended for the use with [employer]® 2.0 or [employer]® [employer] Systems for the simultaneous qualitative detection and identification of multiple bacterial and yeast nucleic acids and select genetic determinants associated with antimicrobial resistance.  The [employer] BCID2 Panel test is performed directly on blood culture samples identified as positive by a continuous monitoring blood culture system.  Results are intended to be interpreted in conjunction with Gram stain results.    Blood Culture #1 **CANNOT BE ORDERED STAT** [[id number]]  (Abnormal) Collected: 05/27/23 2236    Order Status: Completed Specimen: Blood from Arm, Right Updated: 05/29/23 0839     GRAM STAIN Gram Negative Rods      Seen in gram stain of broth only      1 of 2 Aerobic bottles positive    Urine culture [[id number]] Collected: 05/28/23 0235    Order Status: Completed Specimen: Urine Updated: 05/29/23 0625     Urine Culture No Growth At 24 Hours    Blood Culture #2 **CANNOT BE ORDERED STAT** [[id number]]  (Normal) Collected: 05/27/23 2236    Order Status: Completed Specimen: Blood from Antecubital, Right Updated: 05/29/23 0100     CULTURE, BLOOD ([facility]) No Growth At 24 Hours    Urine Culture [id number] [[id number]] Collected: 05/28/23 0235    Order Status: Canceled Specimen: Urine, Bladder Updated: 05/28/23 0258    Urine Culture [id number] [[id number]] Collected: 05/28/23 0236    Order Status: Sent Specimen: Urine, Kidney left              See below for Radiology    Scheduled Med:   enoxparin  40 mg Subcutaneous Daily    magnesium sulfate IVPB  2 g Intravenous Once     piperacillin-tazobactam (ZOSYN) IVPB  4.5 g Intravenous Q8H        Continuous Infusions:   lactated ringers 75 mL/hr at 05/28/23 0437        PRN Meds:  acetaminophen, aluminum-magnesium hydroxide-simethicone, melatonin, naloxone, polyethylene glycol, simethicone       Assessment/Plan:  Gram-negative sandhya sepsis with hypotension  Left pyelonephritis  Left ureterolithiasis with resulting left hydroureteronephrosis status post left double-J stent placement  Sinus bradycardia   Acute kidney injury  Anemia of chronic disease          Plan:  Continue empiric antibiotic therapy and follow up on blood and urine cultures   Appreciate assistance from Urology  Appreciate Cardiology evaluation  Request physical therapy and occupational therapy evaluations  Continue appropriate home medications       Critical Care Diagnosis:  Sepsis requiring broad-spectrum intravenous antibiotic therapy  Critical Care Interventions: Hands-on evaluation, review of labs, radiographs, medical records, and discussion with the patient and medical staff in order to assess and manage the high probability of imminent or life-threatening deterioration of cardio-respiratory status requiring vasopressor support and/or intubation and mechanical ventilation.  Critical Care Time Spent: 35 minutes      VTE prophylaxis:  Lovenox    Patient condition:  Guarded    Anticipated discharge and Disposition:     TBD    All diagnosis and differential diagnosis have been reviewed; assessment and plan has been documented; I have personally reviewed the labs and test results that are presently available; I have reviewed the patients medication list; I have reviewed the consulting providers response and recommendations. I have reviewed or attempted to review medical records based upon their availability    All of the patient's questions have been  addressed and answered. Patient's is agreeable to the above stated plan. I will continue to monitor closely and make  adjustments to medical management as needed.  _____________________________________________________________________    Nutrition Status:    Radiology:  I have personally reviewed the following imaging and agree with the radiologist.     Echo Saline Bubble? No  · TDS due to poor acoustic windows.  · The left ventricle is normal in size with moderate concentric   hypertrophy and normal systolic function.  · The estimated ejection fraction is 55%.  · Grade I left ventricular diastolic dysfunction.  · Normal right ventricular size with normal right ventricular systolic   function.  · Mild tricuspid regurgitation.  · The estimated PA systolic pressure is 43 mmHg.     X-Ray Chest AP Portable  Narrative: EXAMINATION:  XR CHEST AP PORTABLE    CLINICAL HISTORY:  Fever, unspecified    TECHNIQUE:  One view    COMPARISON:  December 15, 2017.    FINDINGS:  Cardiopericardial silhouette is within normal limits.  Obscured hilar and the mediastinal structures.  Lungs hypoventilatory changes without dense focal or segmental consolidation, overt congestive process, pleural effusions or pneumothorax.  Impression: NO ACUTE CARDIOPULMONARY PROCESS IDENTIFIED.    Electronically signed by: Cain Shaffer  Date:    05/28/2023  Time:    11:48  CT Chest Abdomen Pelvis With Contrast (xpd)  Narrative: EXAMINATION:  CT CHEST ABDOMEN PELVIS WITH CONTRAST (XPD)    CLINICAL HISTORY:  Sepsis;    TECHNIQUE:  Helical acquisition from the thoracic inlet through the ischia with  IV contrast. Three plane reconstructions made available for review.  mGycm. Automatic exposure control, adjustment of mA/kV or iterative reconstruction technique was used to reduce radiation.    COMPARISON:  18 December 2017    FINDINGS:  Chest.    Heart is not enlarged.  No pericardial effusion.    No mediastinal, hilar or axillary adenopathy by CT size criteria.    No pleural effusion.  Mild chronic changes of the lungs.  No opacities suspicious for pneumonia.    Abdomen  and pelvis.    Stable appearance of the liver.  Patent portal vein.  There is a calcified gallstone.  No pericholecystic inflammation.  No acute findings spleen, pancreas or adrenals.    There is a distal left ureteral 9-10 mm calculus image 103 series 2.  Moderate associated hydronephrosis with prominent left perinephric stranding.  Heterogeneous nephrogram left kidney.    No bowel obstruction.  There is colonic diverticulosis.  No significant inflammatory changes of the bowel.    Urinary bladder is unremarkable.  No pelvic free fluid.  Abdominal aorta normal in caliber.  Moderate atherosclerotic disease.    No acute osseous findings.  Impression: 1. Distal left ureteral 9-10 mm calculus with moderate obstructive features.  Additionally there is prominent left perinephric stranding with heterogeneous nephrogram, question pyelonephritis.  2. No acute thoracic findings.  3.  No significant discrepancy with the preliminary report.    Electronically signed by: Jeb Wetzel  Date:    05/28/2023  Time:    09:32  SURG FL Surgery Fluoro Usage  See OP Notes for results.     IMPRESSION: See OP Notes for results.     This procedure was auto-finalized by: [facility]      Crescencio Novak MD   05/29/2023

## 2023-05-30 VITALS
OXYGEN SATURATION: 95 % | WEIGHT: 132.06 LBS | BODY MASS INDEX: 22 KG/M2 | SYSTOLIC BLOOD PRESSURE: 130 MMHG | RESPIRATION RATE: 18 BRPM | HEIGHT: 65 IN | HEART RATE: 86 BPM | TEMPERATURE: 98 F | DIASTOLIC BLOOD PRESSURE: 81 MMHG

## 2023-05-30 LAB
ANION GAP SERPL CALC-SCNC: 9 MEQ/L
BASOPHILS # BLD AUTO: 0.06 X10(3)/MCL
BASOPHILS NFR BLD AUTO: 0.4 %
BUN SERPL-MCNC: 15.3 MG/DL (ref 9.8–20.1)
CALCIUM SERPL-MCNC: 7.9 MG/DL (ref 8.4–10.2)
CHLORIDE SERPL-SCNC: 108 MMOL/L (ref 98–107)
CO2 SERPL-SCNC: 21 MMOL/L (ref 23–31)
CREAT SERPL-MCNC: 0.81 MG/DL (ref 0.55–1.02)
CREAT/UREA NIT SERPL: 19
EOSINOPHIL # BLD AUTO: 0.26 X10(3)/MCL (ref 0–0.9)
EOSINOPHIL NFR BLD AUTO: 1.8 %
ERYTHROCYTE [DISTWIDTH] IN BLOOD BY AUTOMATED COUNT: 13.2 % (ref 11.5–17)
GFR SERPLBLD CREATININE-BSD FMLA CKD-EPI: >60 MLS/MIN/1.73/M2
GLUCOSE SERPL-MCNC: 124 MG/DL (ref 82–115)
HCT VFR BLD AUTO: 34.8 % (ref 37–47)
HGB BLD-MCNC: 11.5 G/DL (ref 12–16)
IMM GRANULOCYTES # BLD AUTO: 0.13 X10(3)/MCL (ref 0–0.04)
IMM GRANULOCYTES NFR BLD AUTO: 0.9 %
LYMPHOCYTES # BLD AUTO: 1.68 X10(3)/MCL (ref 0.6–4.6)
LYMPHOCYTES NFR BLD AUTO: 11.6 %
MCH RBC QN AUTO: 31.1 PG (ref 27–31)
MCHC RBC AUTO-ENTMCNC: 33 G/DL (ref 33–36)
MCV RBC AUTO: 94.1 FL (ref 80–94)
MONOCYTES # BLD AUTO: 0.96 X10(3)/MCL (ref 0.1–1.3)
MONOCYTES NFR BLD AUTO: 6.7 %
NEUTROPHILS # BLD AUTO: 11.34 X10(3)/MCL (ref 2.1–9.2)
NEUTROPHILS NFR BLD AUTO: 78.6 %
NRBC BLD AUTO-RTO: 0 %
PLATELET # BLD AUTO: 220 X10(3)/MCL (ref 130–400)
PMV BLD AUTO: 10 FL (ref 7.4–10.4)
POTASSIUM SERPL-SCNC: 3.8 MMOL/L (ref 3.5–5.1)
RBC # BLD AUTO: 3.7 X10(6)/MCL (ref 4.2–5.4)
SODIUM SERPL-SCNC: 138 MMOL/L (ref 136–145)
WBC # SPEC AUTO: 14.43 X10(3)/MCL (ref 4.5–11.5)

## 2023-05-30 PROCEDURE — 25000003 PHARM REV CODE 250: Performed by: NURSE PRACTITIONER

## 2023-05-30 PROCEDURE — 80048 BASIC METABOLIC PNL TOTAL CA: CPT | Performed by: INTERNAL MEDICINE

## 2023-05-30 PROCEDURE — 63600175 PHARM REV CODE 636 W HCPCS: Performed by: NURSE PRACTITIONER

## 2023-05-30 PROCEDURE — 25000003 PHARM REV CODE 250: Performed by: INTERNAL MEDICINE

## 2023-05-30 PROCEDURE — 85025 COMPLETE CBC W/AUTO DIFF WBC: CPT | Performed by: INTERNAL MEDICINE

## 2023-05-30 RX ORDER — CEFDINIR 300 MG/1
300 CAPSULE ORAL EVERY 12 HOURS
Qty: 20 CAPSULE | Refills: 0 | Status: SHIPPED | OUTPATIENT
Start: 2023-05-30 | End: 2023-06-09

## 2023-05-30 RX ORDER — CEFDINIR 300 MG/1
300 CAPSULE ORAL EVERY 12 HOURS
Status: DISCONTINUED | OUTPATIENT
Start: 2023-05-30 | End: 2023-05-30 | Stop reason: HOSPADM

## 2023-05-30 RX ADMIN — PIPERACILLIN AND TAZOBACTAM 4.5 G: 4; .5 INJECTION, POWDER, LYOPHILIZED, FOR SOLUTION INTRAVENOUS; PARENTERAL at 06:05

## 2023-05-30 RX ADMIN — CEFDINIR 300 MG: 300 CAPSULE ORAL at 01:05

## 2023-05-30 NOTE — PROGRESS NOTES
UROLOGY  PROGRESS  NOTE    Fanny Armijo 1940  [id number]  5/30/2023    Patient resting in bed  Denies any discomfort from stent like she has had in past  Urinating without issues, no hematuria    Afebrile, BP stable  Multiple voids overnight, uop not measured  WBC 14.4  H&H 11.5/34.8  BUN/Cr 15.3/0.81    UC with no growth thus far  1 of 2 BC with GNR   On zosyn    Intake/Output:  No intake/output data recorded.  I/O last 3 completed shifts:  In: 1440 [P.O.:1440]  Out: -      Exam:    NAD  Card RRR  Resp unlabored  Abd soft, NTND   No urine available for assessment  Extremity no C/C/E    Recent Results (from the past 24 hour(s))   Basic Metabolic Panel    Collection Time: 05/30/23  4:34 AM   Result Value Ref Range    Sodium Level 138 136 - 145 mmol/L    Potassium Level 3.8 3.5 - 5.1 mmol/L    Chloride 108 (H) 98 - 107 mmol/L    Carbon Dioxide 21 (L) 23 - 31 mmol/L    Glucose Level 124 (H) 82 - 115 mg/dL    Blood Urea Nitrogen 15.3 9.8 - 20.1 mg/dL    Creatinine 0.81 0.55 - 1.02 mg/dL    BUN/Creatinine Ratio 19     Calcium Level Total 7.9 (L) 8.4 - 10.2 mg/dL    Anion Gap 9.0 mEq/L    eGFR >60 mls/min/1.73/m2   CBC with Differential    Collection Time: 05/30/23  4:34 AM   Result Value Ref Range    WBC 14.43 (H) 4.50 - 11.50 x10(3)/mcL    RBC 3.70 (L) 4.20 - 5.40 x10(6)/mcL    Hgb 11.5 (L) 12.0 - 16.0 g/dL    Hct 34.8 (L) 37.0 - 47.0 %    MCV 94.1 (H) 80.0 - 94.0 fL    MCH 31.1 (H) 27.0 - 31.0 pg    MCHC 33.0 33.0 - 36.0 g/dL    RDW 13.2 11.5 - 17.0 %    Platelet 220 130 - 400 x10(3)/mcL    MPV 10.0 7.4 - 10.4 fL    Neut % 78.6 %    Lymph % 11.6 %    Mono % 6.7 %    Eos % 1.8 %    Basophil % 0.4 %    Lymph # 1.68 0.6 - 4.6 x10(3)/mcL    Neut # 11.34 (H) 2.1 - 9.2 x10(3)/mcL    Mono # 0.96 0.1 - 1.3 x10(3)/mcL    Eos # 0.26 0 - 0.9 x10(3)/mcL    Baso # 0.06 <=0.2 x10(3)/mcL    IG# 0.13 (H) 0 - 0.04 x10(3)/mcL    IG% 0.9 %    NRBC% 0.0 %       Assessment:  Large obstructing left distal ureteral stone s/p  ureteral stent 5/28/2023  MELANIA - improved    Plan:  Continue antibiotics and tailor according to final C&S results  Patient will need to f/u with Dr. Bolden in 1-2 weeks for definitive stone Tx      Carri Coyle NP

## 2023-05-30 NOTE — PROGRESS NOTES
[facility]  Hospital Medicine Progress Note        Chief Complaint: Inpatient follow-up on sepsis    HPI:   Fanny Armijo is a 82 y.o. White female with a past medical history of diverticulitis nephrolithiasis. The patient presented to [facility] on 5/27/2023 with a primary complaint of lower quadrant abdominal pain which began yesterday (05/27/2023).  She reports associated symptoms of chills, nausea and 4 episodes of vomiting.  Patient denies complaints of flank pain, chest pain, dizziness, weakness,. She is having epigastric pain since eating breakfast with burping.      Upon presentation to the ED, temperature a 102.2° F, heart rate 135, blood pressure 104/60, respiratory rate 20 and SpO2 96% on 2 L O2 via nasal cannula.  Heart rate intermittently dropping into the 40-50s. Labs with WBC 18.8, potassium 3.0, CO2 21, BUN/creatinine 21.8/1.07 (15/0.67 December 2017), lactic acid 2.5.  UA with less than 5 squamous epithelial cells, 4+ bacteria, trace leukocyte esterase, positive nitrites, 2+ occult blood, trace glucose 1+ protein.  Chest x-ray pending.  Preliminary report of CT abdomen pelvis and chest with contrast revealed moderate left hydroureteronephrosis due to an 11 mm obstructing calculus in the distal ureter with inflammatory stranding around the left kidney and several areas of diminished cortical enhancement so statin element of pyelonephritis is in consideration but no acute intrathoracic abnormalities.  In the ED patient received Tylenol and Zosyn.  Urology was consulted and patient was taken to the OR this morning (05/28/2023) for a left double-J stent placement.  Patient is admitted to hospital medicine services for further medical management.     Interval Hx:   Patient is sitting up in a chair.  No new issues have been reported.  Patient is afebrile, on room air, hemodynamically stable.  Hypotension has improved today.      Objective/physical exam:  General:  Elderly  white female in no acute distress  HENT: normocephalic, atraumatic  Eye: PERRL, EOMI, clear conjunctiva  Neck: full ROM, no thyromegaly, no JVD  Respiratory: clear to auscultation bilaterally  Cardiovascular: regular rate and rhythm  Gastrointestinal: non-distended, positive bowel sounds, non-tender  Musculoskeletal:  Generalized muscular atrophy  Integumentary:  Atrophic skin  Neurological: cranial nerves grossly intact, no focal neurological deficit  Psychiatric: cooperatives    VITAL SIGNS: 24 HRS MIN & MAX LAST   Temp  Min: 97.5 °F (36.4 °C)  Max: 99.3 °F (37.4 °C) 97.5 °F (36.4 °C)   BP  Min: 98/53  Max: 130/82 122/77   Pulse  Min: 86  Max: 101  99   Resp  Min: 18  Max: 18 18   SpO2  Min: 93 %  Max: 96 % (!) 93 %     I have reviewed the following labs:    Recent Labs   Lab 05/28/23  0641 05/29/23  0423 05/30/23  0434   WBC 31.23* 20.48* 14.43*   RBC 3.62* 3.47* 3.70*   HGB 11.3* 10.8* 11.5*   HCT 34.7* 33.5* 34.8*   MCV 95.9* 96.5* 94.1*   MCH 31.2* 31.1* 31.1*   MCHC 32.6* 32.2* 33.0   RDW 13.2 13.5 13.2    191 220   MPV 9.1 9.7 10.0       Recent Labs   Lab 05/27/23 2236 05/28/23  0641 05/29/23  0423 05/30/23  0434    139 137 138   K 3.0* 3.4* 4.0 3.8   CO2 21* 24 24 21*   BUN 21.8* 19.8 20.2* 15.3   CREATININE 1.07* 1.21* 1.03* 0.81   CALCIUM 8.8 9.1 8.0* 7.9*   MG  --  1.70 2.20  --    ALBUMIN 3.4 2.8* 2.5*  --    ALKPHOS 88 65 58  --    ALT 12 14 13  --    AST 27 26 27  --    BILITOT 0.6 0.6 0.6  --           Microbiology Results (last 7 days)       Procedure Component Value Units Date/Time    Blood Culture #1 **CANNOT BE ORDERED STAT** [[id number]]  (Abnormal) Collected: 05/27/23 2236    Order Status: Completed Specimen: Blood from Arm, Right Updated: 05/30/23 1047     CULTURE, BLOOD ([facility]) Susceptibility To Follow      Escherichia coli     GRAM STAIN Gram Negative Rods      Seen in gram stain of broth only      1 of 2 Aerobic bottles positive    Urine culture [[id number]]  (Abnormal) Collected:  05/28/23 0235    Order Status: Completed Specimen: Urine Updated: 05/30/23 0914     Urine Culture 25,000-50,000 colonies/ml Gram-negative Rods    Blood Culture #2 **CANNOT BE ORDERED STAT** [[id number]]  (Normal) Collected: 05/27/23 2236    Order Status: Completed Specimen: Blood from Antecubital, Right Updated: 05/30/23 0100     CULTURE, BLOOD ([facility]) No Growth At 48 Hours    BCID2 Panel [[id number]]  (Abnormal) Collected: 05/27/23 2236    Order Status: Completed Specimen: Blood from Arm, Right Updated: 05/29/23 0951     CTX-M (ESBL ) Not Detected     Comment: Note: Antimicrobial resistance can occur via multiple mechanisms. A Not Detected result for antimicrobial resistance gene(s) does not indicate antimicrobial susceptibility. Subculturing is required for species identification and susceptibility testing of   isolates.        IMP (Cabapenemase ) Not Detected     Comment: Note: Antimicrobial resistance can occur via multiple mechanisms. A Not Detected result for antimicrobial resistance gene(s) does not indicate antimicrobial susceptibility. Subculturing is required for species identification and susceptibility testing of   isolates.        KPC resistance gene (Carbapenemase ) Not Detected     Comment: Note: Antimicrobial resistance can occur via multiple mechanisms. A Not Detected result for antimicrobial resistance gene(s) does not indicate antimicrobial susceptibility. Subculturing is required for species identification and susceptibility testing of   isolates.        mcr-1 Not Detected     Comment: Note: Antimicrobial resistance can occur via multiple mechanisms. A Not Detected result for antimicrobial resistance gene(s) does not indicate antimicrobial susceptibility. Subculturing is required for species identification and susceptibility testing of   isolates.        mecA ID N/A     Comment: Note: Antimicrobial resistance can occur via multiple mechanisms. A Not Detected result for  antimicrobial resistance gene(s) does not indicate antimicrobial susceptibility. Subculturing is required for species identification and susceptibility testing of   isolates.        mecA/C and MREJ (MRSA) gene N/A     Comment: Note: Antimicrobial resistance can occur via multiple mechanisms. A Not Detected result for antimicrobial resistance gene(s) does not indicate antimicrobial susceptibility. Subculturing is required for species identification and susceptibility testing of   isolates.        NDM (Carbapenemase ) Not Detected     Comment: Note: Antimicrobial resistance can occur via multiple mechanisms. A Not Detected result for antimicrobial resistance gene(s) does not indicate antimicrobial susceptibility. Subculturing is required for species identification and susceptibility testing of   isolates.        OXA-48-like (Carbapenemase ) Not Detected     Comment: Note: Antimicrobial resistance can occur via multiple mechanisms. A Not Detected result for antimicrobial resistance gene(s) does not indicate antimicrobial susceptibility. Subculturing is required for species identification and susceptibility testing of   isolates.        Hermilo/B (VRE gene) N/A     Comment: Note: Antimicrobial resistance can occur via multiple mechanisms. A Not Detected result for antimicrobial resistance gene(s) does not indicate antimicrobial susceptibility. Subculturing is required for species identification and susceptibility testing of   isolates.        VIM (Carbapenemase ) Not Detected     Comment: Note: Antimicrobial resistance can occur via multiple mechanisms. A Not Detected result for antimicrobial resistance gene(s) does not indicate antimicrobial susceptibility. Subculturing is required for species identification and susceptibility testing of   isolates.        Enterococcus faecalis Not Detected     Enterococcus faecium Not Detected     Listeria monocytogenes Not Detected     Staphylococcus spp. Not  Detected     Staphylococcus aureus Not Detected     Staphylococcus epidermidis Not Detected     Staphylococcus lugdunensis Not Detected     Streptococcus spp. Not Detected     Streptococcus agalactiae (Group B) Not Detected     Streptococcus pneumoniae Not Detected     Streptococcus pyogenes (Group A) Not Detected     Acinetobacter calcoaceticus/baumannii complex Not Detected     Bacteroides fragilis Not Detected     Enterobacterales Detected     Enterobacter cloacae complex Not Detected     Escherichia coli Detected     Klebsiella aerogenes Not Detected     Klebsiella oxytoca Not Detected     Klebsiella pneumoniae group Not Detected     Proteus spp. Not Detected     Salmonella spp. Not Detected     Serratia marcescens Not Detected     Haemophilus influenzae Not Detected     Neisseria meningitidis Not Detected     Pseudomonas aeruginosa Not Detected     Stenotrophomonas maltophilia Not Detected     Candida albicans Not Detected     Candida auris Not Detected     Candida glabrata Not Detected     Candida krusei Not Detected     Candida parapsilosis Not Detected     Candida tropicalis Not Detected     Cryptococcus neoformans/gattii Not Detected    Narrative:      The [employer] BCID2 Panel is a multiplexed nucleic acid test intended for the use with GIROPTIC® 2.0 or GIROPTIC® EarlySense Systems for the simultaneous qualitative detection and identification of multiple bacterial and yeast nucleic acids and select genetic determinants associated with antimicrobial resistance.  The [employer] BCID2 Panel test is performed directly on blood culture samples identified as positive by a continuous monitoring blood culture system.  Results are intended to be interpreted in conjunction with Gram stain results.    Urine Culture [id number] [[id number]] Collected: 05/28/23 0235    Order Status: Canceled Specimen: Urine, Bladder Updated: 05/28/23 0258    Urine Culture [id number] [[id number]] Collected: 05/28/23 0236    Order Status:  Sent Specimen: Urine, Kidney left              See below for Radiology    Scheduled Med:   cefdinir  300 mg Oral Q12H    enoxparin  40 mg Subcutaneous Daily        Continuous Infusions:   lactated ringers 75 mL/hr at 05/28/23 0437        PRN Meds:  acetaminophen, aluminum-magnesium hydroxide-simethicone, melatonin, naloxone, polyethylene glycol, simethicone       Assessment/Plan:  Gram-negative sandhya (Enterobacterales and Escherichia coli) sepsis with hypotension  Left pyelonephritis  Left ureterolithiasis with resulting left hydroureteronephrosis status post left double-J stent placement  Sinus bradycardia   Acute kidney injury, resolved  Anemia of chronic disease          Plan:  Continue empiric antibiotic therapy and follow up on blood and urine cultures   Appreciate assistance from Urology  Appreciate Cardiology evaluation  Requested physical therapy and occupational therapy evaluations  Continue appropriate home medications if any       Critical Care Diagnosis:  Sepsis requiring broad-spectrum intravenous antibiotic therapy  Critical Care Interventions: Hands-on evaluation, review of labs, radiographs, medical records, and discussion with the patient and medical staff in order to assess and manage the high probability of imminent or life-threatening deterioration of cardio-respiratory status requiring vasopressor support and/or intubation and mechanical ventilation.  Critical Care Time Spent: 35 minutes      VTE prophylaxis:  Lovenox    Patient condition:  Guarded    Anticipated discharge and Disposition:     TBD    All diagnosis and differential diagnosis have been reviewed; assessment and plan has been documented; I have personally reviewed the labs and test results that are presently available; I have reviewed the patients medication list; I have reviewed the consulting providers response and recommendations. I have reviewed or attempted to review medical records based upon their availability    All of the  patient's questions have been  addressed and answered. Patient's is agreeable to the above stated plan. I will continue to monitor closely and make adjustments to medical management as needed.  _____________________________________________________________________    Nutrition Status:    Radiology:  I have personally reviewed the following imaging and agree with the radiologist.     Echo Saline Bubble? No  · TDS due to poor acoustic windows.  · The left ventricle is normal in size with moderate concentric   hypertrophy and normal systolic function.  · The estimated ejection fraction is 55%.  · Grade I left ventricular diastolic dysfunction.  · Normal right ventricular size with normal right ventricular systolic   function.  · Mild tricuspid regurgitation.  · The estimated PA systolic pressure is 43 mmHg.     X-Ray Chest AP Portable  Narrative: EXAMINATION:  XR CHEST AP PORTABLE    CLINICAL HISTORY:  Fever, unspecified    TECHNIQUE:  One view    COMPARISON:  December 15, 2017.    FINDINGS:  Cardiopericardial silhouette is within normal limits.  Obscured hilar and the mediastinal structures.  Lungs hypoventilatory changes without dense focal or segmental consolidation, overt congestive process, pleural effusions or pneumothorax.  Impression: NO ACUTE CARDIOPULMONARY PROCESS IDENTIFIED.    Electronically signed by: Cain Shaffer  Date:    05/28/2023  Time:    11:48  CT Chest Abdomen Pelvis With Contrast (xpd)  Narrative: EXAMINATION:  CT CHEST ABDOMEN PELVIS WITH CONTRAST (XPD)    CLINICAL HISTORY:  Sepsis;    TECHNIQUE:  Helical acquisition from the thoracic inlet through the ischia with  IV contrast. Three plane reconstructions made available for review.  mGycm. Automatic exposure control, adjustment of mA/kV or iterative reconstruction technique was used to reduce radiation.    COMPARISON:  18 December 2017    FINDINGS:  Chest.    Heart is not enlarged.  No pericardial effusion.    No mediastinal, hilar or  axillary adenopathy by CT size criteria.    No pleural effusion.  Mild chronic changes of the lungs.  No opacities suspicious for pneumonia.    Abdomen and pelvis.    Stable appearance of the liver.  Patent portal vein.  There is a calcified gallstone.  No pericholecystic inflammation.  No acute findings spleen, pancreas or adrenals.    There is a distal left ureteral 9-10 mm calculus image 103 series 2.  Moderate associated hydronephrosis with prominent left perinephric stranding.  Heterogeneous nephrogram left kidney.    No bowel obstruction.  There is colonic diverticulosis.  No significant inflammatory changes of the bowel.    Urinary bladder is unremarkable.  No pelvic free fluid.  Abdominal aorta normal in caliber.  Moderate atherosclerotic disease.    No acute osseous findings.  Impression: 1. Distal left ureteral 9-10 mm calculus with moderate obstructive features.  Additionally there is prominent left perinephric stranding with heterogeneous nephrogram, question pyelonephritis.  2. No acute thoracic findings.  3.  No significant discrepancy with the preliminary report.    Electronically signed by: Jeb Wetzel  Date:    05/28/2023  Time:    09:32  SURG FL Surgery Fluoro Usage  See OP Notes for results.     IMPRESSION: See OP Notes for results.     This procedure was auto-finalized by: Virtual Radiologist      Crescencio Novak MD   05/30/2023

## 2023-05-31 LAB
BACTERIA BLD CULT: ABNORMAL
GRAM STN SPEC: ABNORMAL

## 2023-06-01 LAB — BACTERIA UR CULT: ABNORMAL

## 2023-06-02 LAB — BACTERIA BLD CULT: NORMAL

## 2023-06-13 NOTE — ADDENDUM NOTE
Addendum  created 06/13/23 1141 by Anel Cardoso MD    Intraprocedure Event edited, Intraprocedure Staff edited

## 2023-06-13 NOTE — DISCHARGE SUMMARY
[facility]  Hospital Medicine Discharge Summary    Admit Date: 5/27/2023  Discharge Date and Time: 5/30/2023 2:20 PM  Admitting Physician: Crescencio Novak MD  Discharging Physician: Crescencio Novak MD.  Primary Care Physician: Primary Doctor No  Consults: Urology    Discharge Diagnoses:  Sepsis with hypotension  Left pyelonephritis, E. coli  Left ureterolithiasis with resulting left hydroureteronephrosis status post left double-J stent placement  Sinus bradycardia , resolved  Acute kidney injury, resolved  Hypokalemia, resolved    Hospital Course:   Fanny Armijo is a 82 y.o. White female with a past medical history of diverticulitis nephrolithiasis. The patient presented to [facility] on 5/27/2023 with a primary complaint of lower quadrant abdominal pain which began yesterday (05/27/2023).  She reports associated symptoms of chills, nausea and 4 episodes of vomiting.  Patient denies complaints of flank pain, chest pain, dizziness, weakness,. She is having epigastric pain since eating breakfast with burping.      Upon presentation to the ED, temperature a 102.2° F, heart rate 135, blood pressure 104/60, respiratory rate 20 and SpO2 96% on 2 L O2 via nasal cannula.  Heart rate intermittently dropping into the 40-50s. Labs with WBC 18.8, potassium 3.0, CO2 21, BUN/creatinine 21.8/1.07 (15/0.67 December 2017), lactic acid 2.5.  UA with less than 5 squamous epithelial cells, 4+ bacteria, trace leukocyte esterase, positive nitrites, 2+ occult blood, trace glucose 1+ protein.  Chest x-ray pending.  Preliminary report of CT abdomen pelvis and chest with contrast revealed moderate left hydroureteronephrosis due to an 11 mm obstructing calculus in the distal ureter with inflammatory stranding around the left kidney and several areas of diminished cortical enhancement so statin element of pyelonephritis is in consideration but no acute intrathoracic abnormalities.  In the ED patient  received Tylenol and Zosyn.  Urology was consulted and patient was taken to the OR this morning (05/28/2023) for a left double-J stent placement.  Patient is admitted to hospital medicine service.  Patient was cleared for discharge by Urology on 05/30/2023.  Patient was seen and examined on the day of discharge.      Vitals:  VITAL SIGNS: 24 HRS MIN & MAX LAST   No data recorded 97.5 °F (36.4 °C)   No data recorded 130/81   No data recorded  86   No data recorded 18   No data recorded 95 %       Physical Exam:  General:  Elderly white female in no acute distress  HENT: normocephalic, atraumatic  Eye: PERRL, EOMI, clear conjunctiva  Neck: full ROM, no thyromegaly, no JVD  Respiratory: clear to auscultation bilaterally  Cardiovascular: regular rate and rhythm  Gastrointestinal: non-distended, positive bowel sounds, non-tender  Musculoskeletal:  Generalized muscular atrophy  Integumentary:  Atrophic skin  Neurological: cranial nerves grossly intact, no focal neurological deficit  Psychiatric: cooperative       Procedures Performed: No admission procedures for hospital encounter.     Significant Diagnostic Studies: See Full reports for all details    No results for input(s): WBC, RBC, HGB, HCT, MCV, MCH, MCHC, RDW, PLT, MPV, GRAN, LYMPH, MONO, BASO, NRBC in the last 168 hours.    No results for input(s): NA, K, CL, CO2, ANIONGAP, BUN, CREATININE, GLU, CALCIUM, PH, MG, ALBUMIN, PROT, ALKPHOS, ALT, AST, BILITOT in the last 168 hours.     Microbiology Results (last 7 days)       Procedure Component Value Units Date/Time    BCID2 Panel [[id number]]  (Abnormal) Collected: 05/27/23 2236    Order Status: Completed Specimen: Blood from Arm, Right Updated: 05/29/23 0951     CTX-M (ESBL ) Not Detected     Comment: Note: Antimicrobial resistance can occur via multiple mechanisms. A Not Detected result for antimicrobial resistance gene(s) does not indicate antimicrobial susceptibility. Subculturing is required for species  identification and susceptibility testing of   isolates.        IMP (Cabapenemase ) Not Detected     Comment: Note: Antimicrobial resistance can occur via multiple mechanisms. A Not Detected result for antimicrobial resistance gene(s) does not indicate antimicrobial susceptibility. Subculturing is required for species identification and susceptibility testing of   isolates.        KPC resistance gene (Carbapenemase ) Not Detected     Comment: Note: Antimicrobial resistance can occur via multiple mechanisms. A Not Detected result for antimicrobial resistance gene(s) does not indicate antimicrobial susceptibility. Subculturing is required for species identification and susceptibility testing of   isolates.        mcr-1 Not Detected     Comment: Note: Antimicrobial resistance can occur via multiple mechanisms. A Not Detected result for antimicrobial resistance gene(s) does not indicate antimicrobial susceptibility. Subculturing is required for species identification and susceptibility testing of   isolates.        mecA ID N/A     Comment: Note: Antimicrobial resistance can occur via multiple mechanisms. A Not Detected result for antimicrobial resistance gene(s) does not indicate antimicrobial susceptibility. Subculturing is required for species identification and susceptibility testing of   isolates.        mecA/C and MREJ (MRSA) gene N/A     Comment: Note: Antimicrobial resistance can occur via multiple mechanisms. A Not Detected result for antimicrobial resistance gene(s) does not indicate antimicrobial susceptibility. Subculturing is required for species identification and susceptibility testing of   isolates.        NDM (Carbapenemase ) Not Detected     Comment: Note: Antimicrobial resistance can occur via multiple mechanisms. A Not Detected result for antimicrobial resistance gene(s) does not indicate antimicrobial susceptibility. Subculturing is required for species identification and  susceptibility testing of   isolates.        OXA-48-like (Carbapenemase ) Not Detected     Comment: Note: Antimicrobial resistance can occur via multiple mechanisms. A Not Detected result for antimicrobial resistance gene(s) does not indicate antimicrobial susceptibility. Subculturing is required for species identification and susceptibility testing of   isolates.        Hermilo/B (VRE gene) N/A     Comment: Note: Antimicrobial resistance can occur via multiple mechanisms. A Not Detected result for antimicrobial resistance gene(s) does not indicate antimicrobial susceptibility. Subculturing is required for species identification and susceptibility testing of   isolates.        VIM (Carbapenemase ) Not Detected     Comment: Note: Antimicrobial resistance can occur via multiple mechanisms. A Not Detected result for antimicrobial resistance gene(s) does not indicate antimicrobial susceptibility. Subculturing is required for species identification and susceptibility testing of   isolates.        Enterococcus faecalis Not Detected     Enterococcus faecium Not Detected     Listeria monocytogenes Not Detected     Staphylococcus spp. Not Detected     Staphylococcus aureus Not Detected     Staphylococcus epidermidis Not Detected     Staphylococcus lugdunensis Not Detected     Streptococcus spp. Not Detected     Streptococcus agalactiae (Group B) Not Detected     Streptococcus pneumoniae Not Detected     Streptococcus pyogenes (Group A) Not Detected     Acinetobacter calcoaceticus/baumannii complex Not Detected     Bacteroides fragilis Not Detected     Enterobacterales Detected     Enterobacter cloacae complex Not Detected     Escherichia coli Detected     Klebsiella aerogenes Not Detected     Klebsiella oxytoca Not Detected     Klebsiella pneumoniae group Not Detected     Proteus spp. Not Detected     Salmonella spp. Not Detected     Serratia marcescens Not Detected     Haemophilus influenzae Not Detected      Neisseria meningitidis Not Detected     Pseudomonas aeruginosa Not Detected     Stenotrophomonas maltophilia Not Detected     Candida albicans Not Detected     Candida auris Not Detected     Candida glabrata Not Detected     Candida krusei Not Detected     Candida parapsilosis Not Detected     Candida tropicalis Not Detected     Cryptococcus neoformans/gattii Not Detected    Narrative:      The [employer] Cloud BCID2 Panel is a multiplexed nucleic acid test intended for the use with Bacula Systems® 2.0 or BioFire® FilmArray® Apisphere Systems for the simultaneous qualitative detection and identification of multiple bacterial and yeast nucleic acids and select genetic determinants associated with antimicrobial resistance.  The BioFire BCID2 Panel test is performed directly on blood culture samples identified as positive by a continuous monitoring blood culture system.  Results are intended to be interpreted in conjunction with Gram stain results.    Urine Culture [id number] [[id number]] Collected: 05/28/23 0235    Order Status: Canceled Specimen: Urine, Bladder Updated: 05/28/23 0258    Urine Culture [id number] [[id number]] Collected: 05/28/23 0236    Order Status: Canceled Specimen: Urine, Kidney left              Echo Saline Bubble? No  · TDS due to poor acoustic windows.  · The left ventricle is normal in size with moderate concentric   hypertrophy and normal systolic function.  · The estimated ejection fraction is 55%.  · Grade I left ventricular diastolic dysfunction.  · Normal right ventricular size with normal right ventricular systolic   function.  · Mild tricuspid regurgitation.  · The estimated PA systolic pressure is 43 mmHg.     X-Ray Chest AP Portable  Narrative: EXAMINATION:  XR CHEST AP PORTABLE    CLINICAL HISTORY:  Fever, unspecified    TECHNIQUE:  One view    COMPARISON:  December 15, 2017.    FINDINGS:  Cardiopericardial silhouette is within normal limits.  Obscured hilar and the mediastinal structures.  Lungs  hypoventilatory changes without dense focal or segmental consolidation, overt congestive process, pleural effusions or pneumothorax.  Impression: NO ACUTE CARDIOPULMONARY PROCESS IDENTIFIED.    Electronically signed by: Cain Shaffer  Date:    05/28/2023  Time:    11:48  CT Chest Abdomen Pelvis With Contrast (xpd)  Narrative: EXAMINATION:  CT CHEST ABDOMEN PELVIS WITH CONTRAST (XPD)    CLINICAL HISTORY:  Sepsis;    TECHNIQUE:  Helical acquisition from the thoracic inlet through the ischia with  IV contrast. Three plane reconstructions made available for review.  mGycm. Automatic exposure control, adjustment of mA/kV or iterative reconstruction technique was used to reduce radiation.    COMPARISON:  18 December 2017    FINDINGS:  Chest.    Heart is not enlarged.  No pericardial effusion.    No mediastinal, hilar or axillary adenopathy by CT size criteria.    No pleural effusion.  Mild chronic changes of the lungs.  No opacities suspicious for pneumonia.    Abdomen and pelvis.    Stable appearance of the liver.  Patent portal vein.  There is a calcified gallstone.  No pericholecystic inflammation.  No acute findings spleen, pancreas or adrenals.    There is a distal left ureteral 9-10 mm calculus image 103 series 2.  Moderate associated hydronephrosis with prominent left perinephric stranding.  Heterogeneous nephrogram left kidney.    No bowel obstruction.  There is colonic diverticulosis.  No significant inflammatory changes of the bowel.    Urinary bladder is unremarkable.  No pelvic free fluid.  Abdominal aorta normal in caliber.  Moderate atherosclerotic disease.    No acute osseous findings.  Impression: 1. Distal left ureteral 9-10 mm calculus with moderate obstructive features.  Additionally there is prominent left perinephric stranding with heterogeneous nephrogram, question pyelonephritis.  2. No acute thoracic findings.  3.  No significant discrepancy with the preliminary report.    Electronically  signed by: Jeb Wetzel  Date:    05/28/2023  Time:    09:32  SURG FL Surgery Fluoro Usage  See OP Notes for results.     IMPRESSION: See OP Notes for results.     This procedure was auto-finalized by: Virtual Radiologist         Medication List        ASK your doctor about these medications      cefdinir 300 MG capsule  Commonly known as: OMNICEF  Take 1 capsule (300 mg total) by mouth every 12 (twelve) hours. for 10 days  Ask about: Should I take this medication?               Where to Get Your Medications        These medications were sent to [id number] - [address] [address]      Phone: [phone]   cefdinir 300 MG capsule          Explained in detail to the patient about the discharge plan, medications, and follow-up visits. Pt understands and agrees with the treatment plan  Discharge Disposition: Home or Self Care   Discharged Condition: stable  Diet-    Medications Per DC med rec  Activities as tolerated   Follow-up Information       Thania Bolden MD Follow up.    Specialty: Urology  Why: OFFICE WILL CALL WITH DATE/TIME  Contact information:  [address] [facility] [address]  [facility] [address]  [phone]                           For further questions contact hospitalist office    Discharge time 35 minutes    For worsening symptoms, chest pain, shortness of breath, increased abdominal pain, high grade fever, stroke or stroke like symptoms, immediately go to the nearest Emergency Room or call 911 as soon as possible.      Crescencio Alvarado M.D, on 6/13/2023. at 3:34 PM.

## 2023-06-14 NOTE — DISCHARGE INSTRUCTIONS
Patient Education       Lithotripsy for Kidney Stones Discharge Instructions       What care is needed at home?   This is what you will need to know:  Strain your urine by using a filter. This will hold the stone pieces. Save the pieces for your doctor in a zip lock bag. Bring the collected stones on your next visit. Pieces of the kidney stone may pass in the urine for a few days and cause mild pain. Your doctor may give you medication for the pain. Take the medications as ordered by your doctor. You may take over the counter Tylenol for pain.  Drink 8 to 10 glasses of water each day. This will help flush the broken kidney stones out. This will keep your hydrated.   What follow-up care is needed?   Be sure to keep your follow up appointment.  If you have a stent, your doctor may want to take it out or may teach you how to take it out.  Will physical activity be limited?   You may have to limit your activity for a while. Talk to your doctor about the right amount of activity for you.  What changes to diet are needed?   Talk to your doctor or dietitian about your personal diet plan to prevent more stones. Ask if there are foods you should avoid.  What problems could happen?   Pain while pieces of the kidney stone pass  Pain or irritation from the stent, especially when urinating  Blocked urine flow if stone fragments are too big to pass  Kidney or ureter injury  High blood pressure  Fevers or urinary tract infection  Blood in the urine  Not able to pass urine  Bruising  Discomfort in the back or belly  What can be done to prevent this health problem?   Prevent or treat urinary tract infections.  Drink lots of water during the day and evening. When you have less fluid in your body, urine becomes concentrated. This increases your chance of kidney stones.  Follow the diet plan your dietitian gives you to prevent kidney stones.  Limit foods or drugs that may cause kidney stones.  When do I need to call the doctor?   Signs  of infection. These include a fever of 100.4°F (38°C) or higher, chills, pain or burning with passing urine.  Very bad pain in your back or side that will not go away  Throwing up  Urine smells bad, looks cloudy, or has blood in it  No urine for more than 6 hours  Very bad pain in your chest, shoulder, or belly  More swelling of your ankles, legs, and hands or tightness with your shoes or rings     Patient Education       Cystoscopy Discharge Instructions     About this topic   Your kidneys make urine. It is stored in your bladder. The urethra is a tube at the bottom of the bladder. Urine flows out of this tube. Sometimes, there is a blockage and urine is not able to leave the body.  A cystoscopy is a procedure that lets the doctor see the inside of your bladder and urethra. The doctor does it to:  Look for stones or tumors blocking the bladder and urethra  Look for changes or injury inside the bladder  Take a tissue sample from the inside of your bladder  Look for reasons for blood in the urine, pain with urination, or why you are passing urine often  Look for prostate problems     What care is needed at home?   Take a warm bath or use a warm wet washcloth over the opening to the urethra. This will help to ease any pain. Do this as needed.  Drink 6 to 8 glasses of water a day and 3 to 4 glasses in the first few hours after the procedure to flush out your bladder and reduce irritation.  You may see some blood in your urine for a few days. This is normal.  Empty your bladder as soon as you feel the need to. Don't delay going to the bathroom. It stretches and weakens the bladder.    What follow-up care is needed?   Be sure to keep your follow up visit.  If you had a biopsy, talk with your doctor about the results.    Will physical activity be limited?   Talk to your doctor about when you may go back to your normal activities like work, driving, or sex.    What problems could happen?   Bleeding  Infection  Injury to  the bladder and urethra  Discomfort in the urethra area  Burning sensation for a short time  Upset stomach    When do I need to call the doctor?   Signs of infection. These include a fever of 100.4°F (38°C) or higher, chills, pain with passing urine.  Pain that does not go away even with drugs or that lasts longer than 2 days  Too much blood in your urine  Passing large dime-sized clots  Cloudy urine  Little or no urine or not able to pass urine  Abdominal pain and nausea

## 2023-06-15 ENCOUNTER — ANESTHESIA EVENT (OUTPATIENT)
Dept: SURGERY | Facility: HOSPITAL | Age: 83
End: 2023-06-15
Payer: MEDICARE

## 2023-06-15 ENCOUNTER — ANESTHESIA (OUTPATIENT)
Dept: SURGERY | Facility: HOSPITAL | Age: 83
End: 2023-06-15
Payer: MEDICARE

## 2023-06-15 ENCOUNTER — HOSPITAL ENCOUNTER (OUTPATIENT)
Facility: HOSPITAL | Age: 83
Discharge: HOME | End: 2023-06-15
Attending: UROLOGY | Admitting: UROLOGY
Payer: MEDICARE

## 2023-06-15 DIAGNOSIS — N20.1 URETERAL CALCULUS: Primary | ICD-10-CM

## 2023-06-15 PROCEDURE — 63600175 PHARM REV CODE 636 W HCPCS: Performed by: NURSE ANESTHETIST, CERTIFIED REGISTERED

## 2023-06-15 PROCEDURE — C1758 CATHETER, URETERAL: HCPCS | Performed by: UROLOGY

## 2023-06-15 PROCEDURE — 36000706: Performed by: UROLOGY

## 2023-06-15 PROCEDURE — D9220A PRA ANESTHESIA: ICD-10-PCS | Mod: CRNA,,, | Performed by: NURSE ANESTHETIST, CERTIFIED REGISTERED

## 2023-06-15 PROCEDURE — 27201423 OPTIME MED/SURG SUP & DEVICES STERILE SUPPLY: Performed by: UROLOGY

## 2023-06-15 PROCEDURE — 51798 US URINE CAPACITY MEASURE: CPT | Performed by: UROLOGY

## 2023-06-15 PROCEDURE — C2617 STENT, NON-COR, TEM W/O DEL: HCPCS | Performed by: UROLOGY

## 2023-06-15 PROCEDURE — 71000033 HC RECOVERY, INTIAL HOUR: Performed by: UROLOGY

## 2023-06-15 PROCEDURE — 37000009 HC ANESTHESIA EA ADD 15 MINS: Performed by: UROLOGY

## 2023-06-15 PROCEDURE — D9220A PRA ANESTHESIA: Mod: ANES,,, | Performed by: ANESTHESIOLOGY

## 2023-06-15 PROCEDURE — 82365 CALCULUS SPECTROSCOPY: CPT

## 2023-06-15 PROCEDURE — 63600175 PHARM REV CODE 636 W HCPCS: Performed by: UROLOGY

## 2023-06-15 PROCEDURE — 25000003 PHARM REV CODE 250: Performed by: ANESTHESIOLOGY

## 2023-06-15 PROCEDURE — 36000707: Performed by: UROLOGY

## 2023-06-15 PROCEDURE — 25000003 PHARM REV CODE 250: Performed by: UROLOGY

## 2023-06-15 PROCEDURE — D9220A PRA ANESTHESIA: Mod: CRNA,,, | Performed by: NURSE ANESTHETIST, CERTIFIED REGISTERED

## 2023-06-15 PROCEDURE — 71000015 HC POSTOP RECOV 1ST HR: Performed by: UROLOGY

## 2023-06-15 PROCEDURE — 37000008 HC ANESTHESIA 1ST 15 MINUTES: Performed by: UROLOGY

## 2023-06-15 PROCEDURE — 25000003 PHARM REV CODE 250: Performed by: NURSE ANESTHETIST, CERTIFIED REGISTERED

## 2023-06-15 PROCEDURE — 71000016 HC POSTOP RECOV ADDL HR: Performed by: UROLOGY

## 2023-06-15 PROCEDURE — 88300 SURGICAL PATH GROSS: CPT | Performed by: UROLOGY

## 2023-06-15 PROCEDURE — D9220A PRA ANESTHESIA: ICD-10-PCS | Mod: ANES,,, | Performed by: ANESTHESIOLOGY

## 2023-06-15 DEVICE — STENT URT DBL PGTL FLX AQUA 6F: Type: IMPLANTABLE DEVICE | Site: URETER | Status: FUNCTIONAL

## 2023-06-15 RX ORDER — HYDROCODONE BITARTRATE AND ACETAMINOPHEN 5; 325 MG/1; MG/1
1 TABLET ORAL EVERY 6 HOURS PRN
Qty: 16 TABLET | Refills: 0 | Status: SHIPPED | OUTPATIENT
Start: 2023-06-15

## 2023-06-15 RX ORDER — LIDOCAINE HYDROCHLORIDE 20 MG/ML
JELLY TOPICAL
Status: DISCONTINUED | OUTPATIENT
Start: 2023-06-15 | End: 2023-06-15 | Stop reason: HOSPADM

## 2023-06-15 RX ORDER — NITROFURANTOIN 25; 75 MG/1; MG/1
100 CAPSULE ORAL 2 TIMES DAILY
Qty: 10 CAPSULE | Refills: 0 | Status: SHIPPED | OUTPATIENT
Start: 2023-06-15 | End: 2023-06-20

## 2023-06-15 RX ORDER — MIDAZOLAM HYDROCHLORIDE 1 MG/ML
2 INJECTION INTRAMUSCULAR; INTRAVENOUS ONCE AS NEEDED
Status: DISCONTINUED | OUTPATIENT
Start: 2023-06-15 | End: 2023-06-15 | Stop reason: HOSPADM

## 2023-06-15 RX ORDER — TAMSULOSIN HYDROCHLORIDE 0.4 MG/1
0.4 CAPSULE ORAL DAILY
Qty: 5 CAPSULE | Refills: 0 | Status: SHIPPED | OUTPATIENT
Start: 2023-06-15 | End: 2023-06-20

## 2023-06-15 RX ORDER — ONDANSETRON 2 MG/ML
4 INJECTION INTRAMUSCULAR; INTRAVENOUS DAILY PRN
Status: DISCONTINUED | OUTPATIENT
Start: 2023-06-15 | End: 2023-06-15 | Stop reason: HOSPADM

## 2023-06-15 RX ORDER — DIPHENHYDRAMINE HYDROCHLORIDE 50 MG/ML
25 INJECTION INTRAMUSCULAR; INTRAVENOUS EVERY 6 HOURS PRN
Status: DISCONTINUED | OUTPATIENT
Start: 2023-06-15 | End: 2023-06-15 | Stop reason: HOSPADM

## 2023-06-15 RX ORDER — LIDOCAINE HYDROCHLORIDE 10 MG/ML
1 INJECTION, SOLUTION EPIDURAL; INFILTRATION; INTRACAUDAL; PERINEURAL ONCE
Status: COMPLETED | OUTPATIENT
Start: 2023-06-15 | End: 2023-06-15

## 2023-06-15 RX ORDER — ACETAMINOPHEN 10 MG/ML
1000 INJECTION, SOLUTION INTRAVENOUS ONCE
Status: DISCONTINUED | OUTPATIENT
Start: 2023-06-15 | End: 2023-06-15 | Stop reason: HOSPADM

## 2023-06-15 RX ORDER — PROPOFOL 10 MG/ML
VIAL (ML) INTRAVENOUS
Status: DISCONTINUED | OUTPATIENT
Start: 2023-06-15 | End: 2023-06-15

## 2023-06-15 RX ORDER — SODIUM CHLORIDE, SODIUM GLUCONATE, SODIUM ACETATE, POTASSIUM CHLORIDE AND MAGNESIUM CHLORIDE 30; 37; 368; 526; 502 MG/100ML; MG/100ML; MG/100ML; MG/100ML; MG/100ML
INJECTION, SOLUTION INTRAVENOUS CONTINUOUS
Status: DISCONTINUED | OUTPATIENT
Start: 2023-06-15 | End: 2023-06-15 | Stop reason: HOSPADM

## 2023-06-15 RX ORDER — CEFTRIAXONE 1 G/1
1 INJECTION, POWDER, FOR SOLUTION INTRAMUSCULAR; INTRAVENOUS
Status: COMPLETED | OUTPATIENT
Start: 2023-06-15 | End: 2023-06-15

## 2023-06-15 RX ORDER — FENTANYL CITRATE 50 UG/ML
INJECTION, SOLUTION INTRAMUSCULAR; INTRAVENOUS
Status: DISCONTINUED | OUTPATIENT
Start: 2023-06-15 | End: 2023-06-15

## 2023-06-15 RX ORDER — DEXAMETHASONE SODIUM PHOSPHATE 4 MG/ML
INJECTION, SOLUTION INTRA-ARTICULAR; INTRALESIONAL; INTRAMUSCULAR; INTRAVENOUS; SOFT TISSUE
Status: DISCONTINUED | OUTPATIENT
Start: 2023-06-15 | End: 2023-06-15

## 2023-06-15 RX ORDER — LIDOCAINE HYDROCHLORIDE 20 MG/ML
JELLY TOPICAL
Status: DISCONTINUED
Start: 2023-06-15 | End: 2023-06-15 | Stop reason: HOSPADM

## 2023-06-15 RX ORDER — GLYCOPYRROLATE 0.2 MG/ML
INJECTION INTRAMUSCULAR; INTRAVENOUS
Status: DISCONTINUED | OUTPATIENT
Start: 2023-06-15 | End: 2023-06-15

## 2023-06-15 RX ORDER — HYDROMORPHONE HYDROCHLORIDE 2 MG/ML
0.2 INJECTION, SOLUTION INTRAMUSCULAR; INTRAVENOUS; SUBCUTANEOUS EVERY 5 MIN PRN
Status: DISCONTINUED | OUTPATIENT
Start: 2023-06-15 | End: 2023-06-15 | Stop reason: HOSPADM

## 2023-06-15 RX ORDER — PHENYLEPHRINE HYDROCHLORIDE 10 MG/ML
INJECTION INTRAVENOUS
Status: DISCONTINUED | OUTPATIENT
Start: 2023-06-15 | End: 2023-06-15

## 2023-06-15 RX ADMIN — PHENYLEPHRINE HYDROCHLORIDE 100 MCG: 10 INJECTION INTRAVENOUS at 09:06

## 2023-06-15 RX ADMIN — CEFTRIAXONE SODIUM 1 G: 1 INJECTION, POWDER, FOR SOLUTION INTRAMUSCULAR; INTRAVENOUS at 09:06

## 2023-06-15 RX ADMIN — FENTANYL CITRATE 25 MCG: 50 INJECTION, SOLUTION INTRAMUSCULAR; INTRAVENOUS at 09:06

## 2023-06-15 RX ADMIN — SODIUM CHLORIDE, POTASSIUM CHLORIDE, SODIUM LACTATE AND CALCIUM CHLORIDE: 600; 310; 30; 20 INJECTION, SOLUTION INTRAVENOUS at 09:06

## 2023-06-15 RX ADMIN — DEXAMETHASONE SODIUM PHOSPHATE 4 MG: 4 INJECTION, SOLUTION INTRA-ARTICULAR; INTRALESIONAL; INTRAMUSCULAR; INTRAVENOUS; SOFT TISSUE at 09:06

## 2023-06-15 RX ADMIN — LIDOCAINE HYDROCHLORIDE 50 MG: 10 INJECTION, SOLUTION EPIDURAL; INFILTRATION; INTRACAUDAL; PERINEURAL at 09:06

## 2023-06-15 RX ADMIN — GLYCOPYRROLATE 0.2 MG: 0.2 INJECTION INTRAMUSCULAR; INTRAVENOUS at 09:06

## 2023-06-15 RX ADMIN — PROPOFOL 100 MG: 10 INJECTION, EMULSION INTRAVENOUS at 09:06

## 2023-06-15 NOTE — OP NOTE
[facility] Surgical - Periop Services  Surgery Department  Operative Note    SUMMARY     Date of Procedure: 6/15/2023     Procedure:  Left ureteroscopy with laser lithotripsy and exchange of ureteral stent.  Extraction of ureteral stone.    Surgeon: Thania Bolden        Pre-Operative Diagnosis:  Left ureteral stone.    Post-Operative Diagnosis:  Same    Anesthesia: General    Operative Findings:  Patient presented few weeks ago with a an obstructing stone in the left upper ureter.  She underwent ureteral stent placement.  She was treated for a UTI and discharged home.  She now presents for elective treatment of her stone.    After satisfactory informed written consent was obtained she was taken to the operating suite.  General anesthesia was administered.  She was placed in the dorsal lithotomy position in the area of the genitalia was prepped and draped sterilely.  A 22 [place] cysto urethra was introduced into the bladder.  The tip of the ureteral stent was grasped and extracted from the urethra.  A guidewire was placed through the stent to the renal pelvis.  Ureteroscopy was performed alongside the wire until the stone was encountered.  The 365 micron holmium laser lithotripsy fiber was used to fragment the stone into smaller pieces.  A tipless Nitinol basket was then used to engage the stone fragments and they were extracted from the ureter without trauma.  No remaining stones within the length of the ureter.  The guidewire was backloaded into the cystoscope and over this was inserted a 6 [place]tarian by 26 cm double-J ureteral stent with 1 coil.    A section of pull-out string was left intact and hanging from patient's urethra.  We can 10 cc 2% viscous was injected into the urethra as a topical anesthetic the patient was awakened and transferred to recovery in stable condition.    Estimated Blood Loss (EBL): * No values recorded between 6/15/2023  9:25 AM and 6/15/2023  9:55 AM *           Implants:    Implant Name Type Inv. Item Serial No.  Lot No. LRB No. Used Action   STENT URT DBL PGTL FLX AQUA 6F - SNA Stent STENT URT DBL PGTL FLX AQUA 6F NA [employer] [id number] Left 1 Implanted   LEFT URETERAL STENT      Left 1 Explanted       Specimens:   Specimen (24h ago, onward)      None                    Condition: Good    Disposition: PACU - hemodynamically stable.    Attestation: I was present and scrubbed for the entire procedure.    Stable

## 2023-06-15 NOTE — TRANSFER OF CARE
"Anesthesia Transfer of Care Note    Patient: Fanny Armijo    Procedure(s) Performed: Procedure(s) (LRB):  CYSTOURETEROSCOPY, WITH HOLMIUM LASER LITHOTRIPSY OF URETERAL CALCULUS AND STENT INSERTION   / stent exchange   Left (Left)    Patient location: PACU    Anesthesia Type: general    Transport from OR: Transported from OR on room air with adequate spontaneous ventilation    Post pain: adequate analgesia    Post assessment: no apparent anesthetic complications and tolerated procedure well    Post vital signs: stable    Level of consciousness: responds to stimulation    Nausea/Vomiting: no nausea/vomiting    Complications: none    Transfer of care protocol was followed      Last vitals:   Visit Vitals  /75 (BP Location: Left arm, Patient Position: Lying)   Pulse 85   Temp 36.5 °C (97.7 °F) (Tympanic)   Resp 16   Ht 5' 5" (1.651 m)   Wt 61.6 kg (135 lb 12.9 oz)   SpO2 96%   Breastfeeding No   BMI 22.60 kg/m²     "

## 2023-06-15 NOTE — PLAN OF CARE
Pt is aaox4-denies c/o-rosy score 10-she meets criteria for phase 2 care per dr robertson-to rm 9 via bed

## 2023-06-15 NOTE — DISCHARGE SUMMARY
[facility] Surgical - Periop Services  Discharge Note  Short Stay    Procedure(s) (LRB):  CYSTOURETEROSCOPY, WITH HOLMIUM LASER LITHOTRIPSY OF URETERAL CALCULUS AND STENT INSERTION   / stent exchange   Left (Left)      OUTCOME: Patient tolerated treatment/procedure well without complication and is now ready for discharge.    DISPOSITION: Home or Self Care    FINAL DIAGNOSIS: Ureteral stone    FOLLOWUP: In clinic Monday 6/19/23 0900    DISCHARGE INSTRUCTIONS:    Discharge Procedure Orders   Diet Adult Regular     Notify your health care provider if you experience any of the following:  temperature >100.4     Notify your health care provider if you experience any of the following:  severe uncontrolled pain     No dressing needed     Activity as tolerated        TIME SPENT ON DISCHARGE: 15 minutes

## 2023-06-15 NOTE — ANESTHESIA PROCEDURE NOTES
Intubation    Date/Time: 6/15/2023 9:18 AM  Performed by: Elvia Novak CRNA  Authorized by: Van Krause MD     Intubation:     Induction:  Intravenous    Intubated:  Postinduction    Mask Ventilation:  Easy with oral airway    Attempts:  1    Attempted By:  CRNA    Difficult Airway Encountered?: No      Airway Device:  Supraglottic airway/LMA    Airway Device Size:  3.0    Style/Cuff Inflation:  Cuffed (inflated to minimal occlusive pressure)    Inflation Amount (mL):  18    Placement Verified By:  Capnometry    Complicating Factors:  None    Findings Post-Intubation:  BS equal bilateral and atraumatic/condition of teeth unchanged

## 2023-06-15 NOTE — ANESTHESIA PREPROCEDURE EVALUATION
06/15/2023  Fanny Armijo is a 82 y.o., female pmhx of nephrolithiasis, diverticulitis and colitis  presents with Left hydroneph with obstructing stone for laser lithotripsy and stent.  She did well in the recent past for similar cysto procedure under general anesthesia to address this stone.  She denies cardiopulmonary complaints.      Pre-op Assessment    I have reviewed the Patient Summary Reports.     I have reviewed the Nursing Notes. I have reviewed the NPO Status.   I have reviewed the Medications.     Review of Systems  Anesthesia Hx:  HR all over the place during prior surgery (laser litho)- not A-fib per pt   Social:  Non-Smoker    Renal/:   Chronic Renal Disease renal calculi        Physical Exam  General: Well nourished, Cooperative, Alert and Oriented    Airway:  Mallampati: II   Mouth Opening: Normal  TM Distance: Normal  Tongue: Normal  Neck ROM: Normal ROM    Dental:  Intact        Anesthesia Plan  Type of Anesthesia, risks & benefits discussed:    Anesthesia Type: Gen Supraglottic Airway  Intra-op Monitoring Plan: Standard ASA Monitors  Post Op Pain Control Plan: multimodal analgesia  Induction:  IV  Airway Plan: Direct, Post-Induction  Informed Consent: Informed consent signed with the Patient and all parties understand the risks and agree with anesthesia plan.  All questions answered. Patient consented to blood products? Yes  ASA Score: 2  Day of Surgery Review of History & Physical: H&P Update referred to the surgeon/provider.    Ready For Surgery From Anesthesia Perspective.     .

## 2023-06-16 VITALS
HEART RATE: 126 BPM | BODY MASS INDEX: 22.63 KG/M2 | RESPIRATION RATE: 16 BRPM | TEMPERATURE: 98 F | SYSTOLIC BLOOD PRESSURE: 146 MMHG | HEIGHT: 65 IN | WEIGHT: 135.81 LBS | OXYGEN SATURATION: 93 % | DIASTOLIC BLOOD PRESSURE: 81 MMHG

## 2023-06-19 LAB — PSYCHE PATHOLOGY RESULT: NORMAL

## 2023-06-21 NOTE — ANESTHESIA POSTPROCEDURE EVALUATION
Anesthesia Post Evaluation    Patient: Fanny Armijo    Procedure(s) Performed: Procedure(s) (LRB):  CYSTOURETEROSCOPY, WITH HOLMIUM LASER LITHOTRIPSY OF URETERAL CALCULUS AND STENT INSERTION   / stent exchange   Left (Left)    Final Anesthesia Type: general      Patient location during evaluation: PACU  Patient participation: Yes- Able to Participate  Level of consciousness: awake and alert  Post-procedure vital signs: reviewed and stable  Pain management: adequate  Airway patency: patent      Anesthetic complications: no      Cardiovascular status: blood pressure returned to baseline  Respiratory status: unassisted  Hydration status: euvolemic  Follow-up not needed.          Vitals Value Taken Time   /81 06/15/23 1237   Temp 36.5 °C (97.7 °F) 06/15/23 1030   Pulse 126 06/15/23 1340   Resp 16 06/15/23 1030   SpO2 93 % 06/15/23 1340         Event Time   Out of Recovery 10:34:00         Pain/Myron Score: No data recorded

## 2025-02-09 ENCOUNTER — HOSPITAL ENCOUNTER (EMERGENCY)
Facility: HOSPITAL | Age: 85
Discharge: HOME | End: 2025-02-09
Attending: EMERGENCY MEDICINE
Payer: MEDICARE

## 2025-02-09 VITALS
DIASTOLIC BLOOD PRESSURE: 76 MMHG | WEIGHT: 125 LBS | TEMPERATURE: 98 F | RESPIRATION RATE: 14 BRPM | HEIGHT: 63 IN | SYSTOLIC BLOOD PRESSURE: 127 MMHG | OXYGEN SATURATION: 95 % | BODY MASS INDEX: 22.15 KG/M2 | HEART RATE: 103 BPM

## 2025-02-09 DIAGNOSIS — R10.32 LLQ ABDOMINAL PAIN: ICD-10-CM

## 2025-02-09 DIAGNOSIS — N30.00 ACUTE CYSTITIS WITHOUT HEMATURIA: Primary | ICD-10-CM

## 2025-02-09 LAB
ALBUMIN SERPL-MCNC: 3.5 G/DL (ref 3.4–4.8)
ALBUMIN/GLOB SERPL: 0.9 RATIO (ref 1.1–2)
ALP SERPL-CCNC: 93 UNIT/L (ref 40–150)
ALT SERPL-CCNC: 14 UNIT/L (ref 0–55)
ANION GAP SERPL CALC-SCNC: 9 MEQ/L
AST SERPL-CCNC: 25 UNIT/L (ref 5–34)
BACTERIA #/AREA URNS AUTO: ABNORMAL /HPF
BASOPHILS # BLD AUTO: 0.08 X10(3)/MCL
BASOPHILS NFR BLD AUTO: 0.7 %
BILIRUB SERPL-MCNC: 0.4 MG/DL
BILIRUB UR QL STRIP.AUTO: NEGATIVE
BUN SERPL-MCNC: 14.5 MG/DL (ref 9.8–20.1)
CALCIUM SERPL-MCNC: 8.7 MG/DL (ref 8.4–10.2)
CHLORIDE SERPL-SCNC: 107 MMOL/L (ref 98–107)
CLARITY UR: ABNORMAL
CO2 SERPL-SCNC: 21 MMOL/L (ref 23–31)
COLOR UR AUTO: YELLOW
CREAT SERPL-MCNC: 0.84 MG/DL (ref 0.55–1.02)
CREAT/UREA NIT SERPL: 17
EOSINOPHIL # BLD AUTO: 0.42 X10(3)/MCL (ref 0–0.9)
EOSINOPHIL NFR BLD AUTO: 3.8 %
ERYTHROCYTE [DISTWIDTH] IN BLOOD BY AUTOMATED COUNT: 13.2 % (ref 11.5–17)
GFR SERPLBLD CREATININE-BSD FMLA CKD-EPI: >60 ML/MIN/1.73/M2
GLOBULIN SER-MCNC: 3.8 GM/DL (ref 2.4–3.5)
GLUCOSE SERPL-MCNC: 96 MG/DL (ref 82–115)
GLUCOSE UR QL STRIP: NORMAL
HCT VFR BLD AUTO: 39.4 % (ref 37–47)
HGB BLD-MCNC: 13.1 G/DL (ref 12–16)
HGB UR QL STRIP: ABNORMAL
IMM GRANULOCYTES # BLD AUTO: 0.07 X10(3)/MCL (ref 0–0.04)
IMM GRANULOCYTES NFR BLD AUTO: 0.6 %
KETONES UR QL STRIP: NEGATIVE
LEUKOCYTE ESTERASE UR QL STRIP: 500
LYMPHOCYTES # BLD AUTO: 2.13 X10(3)/MCL (ref 0.6–4.6)
LYMPHOCYTES NFR BLD AUTO: 19.3 %
MCH RBC QN AUTO: 31.4 PG (ref 27–31)
MCHC RBC AUTO-ENTMCNC: 33.2 G/DL (ref 33–36)
MCV RBC AUTO: 94.5 FL (ref 80–94)
MONOCYTES # BLD AUTO: 0.8 X10(3)/MCL (ref 0.1–1.3)
MONOCYTES NFR BLD AUTO: 7.3 %
MUCOUS THREADS URNS QL MICRO: ABNORMAL /LPF
NEUTROPHILS # BLD AUTO: 7.53 X10(3)/MCL (ref 2.1–9.2)
NEUTROPHILS NFR BLD AUTO: 68.3 %
NITRITE UR QL STRIP: ABNORMAL
NRBC BLD AUTO-RTO: 0 %
PH UR STRIP: 6 [PH]
PLATELET # BLD AUTO: 292 X10(3)/MCL (ref 130–400)
PMV BLD AUTO: 9 FL (ref 7.4–10.4)
POTASSIUM SERPL-SCNC: 3.7 MMOL/L (ref 3.5–5.1)
PROT SERPL-MCNC: 7.3 GM/DL (ref 5.8–7.6)
PROT UR QL STRIP: ABNORMAL
RBC # BLD AUTO: 4.17 X10(6)/MCL (ref 4.2–5.4)
RBC #/AREA URNS AUTO: ABNORMAL /HPF
SODIUM SERPL-SCNC: 137 MMOL/L (ref 136–145)
SP GR UR STRIP.AUTO: 1.01 (ref 1–1.03)
SQUAMOUS #/AREA URNS LPF: ABNORMAL /HPF
UROBILINOGEN UR STRIP-ACNC: NORMAL
WBC # BLD AUTO: 11.03 X10(3)/MCL (ref 4.5–11.5)
WBC #/AREA URNS AUTO: >100 /HPF
WBC CLUMPS UR QL AUTO: ABNORMAL

## 2025-02-09 PROCEDURE — 87186 SC STD MICRODIL/AGAR DIL: CPT | Performed by: PHYSICIAN ASSISTANT

## 2025-02-09 PROCEDURE — 99284 EMERGENCY DEPT VISIT MOD MDM: CPT | Mod: 25

## 2025-02-09 PROCEDURE — 81001 URINALYSIS AUTO W/SCOPE: CPT | Performed by: PHYSICIAN ASSISTANT

## 2025-02-09 PROCEDURE — 80053 COMPREHEN METABOLIC PANEL: CPT | Performed by: PHYSICIAN ASSISTANT

## 2025-02-09 PROCEDURE — 85025 COMPLETE CBC W/AUTO DIFF WBC: CPT | Performed by: PHYSICIAN ASSISTANT

## 2025-02-09 RX ORDER — CEFDINIR 300 MG/1
300 CAPSULE ORAL 2 TIMES DAILY
Qty: 20 CAPSULE | Refills: 0 | Status: SHIPPED | OUTPATIENT
Start: 2025-02-09 | End: 2025-02-19

## 2025-02-09 NOTE — ED PROVIDER NOTES
Encounter Date: 2/9/2025       History     Chief Complaint   Patient presents with    Urinary Frequency     Pt co bladder infection, sees [facility]. Pt co that her urine was tan color for past few days and today it cleared up but is still burning. Pt found old ABX cefidinir took last 4 pills     84-year-old female presents to the emergency department for evaluation discolored urine and burning with urination present for the last several days.  States history of frequent bladder infections, follows outpatient [facility] urology.  States had leftover cefdinir at home, has taken 4 doses with some improvement.  Denies any fever.    The history is provided by the patient and medical records.     Review of patient's allergies indicates:  No Known Allergies  Past Medical History:   Diagnosis Date    Diverticulitis     Kidney stone     Pyelonephritis     Sepsis, unspecified organism     5-2023     Past Surgical History:   Procedure Laterality Date    bladder lift      CYSTOSCOPY W/ URETERAL STENT PLACEMENT N/A 05/28/2023    Procedure: CYSTOSCOPY, WITH URETERAL STENT INSERTION;  Surgeon: Juan Casillas MD;  [facility];  Service: Urology;  Laterality: N/A;    CYSTOURETEROSCOPY, WITH HOLMIUM LASER LITHOTRIPSY OF URETERAL CALCULUS AND STENT INSERTION Left 6/15/2023    Procedure: CYSTOURETEROSCOPY, WITH HOLMIUM LASER LITHOTRIPSY OF URETERAL CALCULUS AND STENT INSERTION   / stent exchange   Left;  Surgeon: Thania Bolden MD;  Location: [facility];  Service: Urology;  Laterality: Left;    HYSTERECTOMY       No family history on file.  Social History     Tobacco Use    Smoking status: Never    Smokeless tobacco: Never   Substance Use Topics    Alcohol use: Never    Drug use: Never     Review of Systems   Constitutional:  Negative for fever.   Gastrointestinal:  Negative for vomiting.   Genitourinary:  Positive for dysuria and urgency.       Physical Exam     Initial Vitals [02/09/25 1101]   BP Pulse Resp  Temp SpO2   127/76 101 20 98 °F (36.7 °C) 99 %      MAP       --         Physical Exam    Nursing note and vitals reviewed.  Constitutional: She appears well-developed and well-nourished. No distress.   HENT:   Head: Normocephalic and atraumatic. Mouth/Throat: Oropharynx is clear and moist.   Eyes: Conjunctivae are normal. Pupils are equal, round, and reactive to light.   Neck: Neck supple.   Normal range of motion.  Cardiovascular:  Normal rate, regular rhythm and normal heart sounds.           No murmur heard.  Pulmonary/Chest: Breath sounds normal. No respiratory distress.   Abdominal: Abdomen is soft. She exhibits no distension. There is abdominal tenderness (Suprapubic, left lower quadrant). There is no rebound and no guarding.   Musculoskeletal:         General: Normal range of motion.      Cervical back: Normal range of motion and neck supple.     Neurological: She is alert and oriented to person, place, and time. GCS score is 15. GCS eye subscore is 4. GCS verbal subscore is 5. GCS motor subscore is 6.   Skin: Skin is warm and dry. No rash noted.   Psychiatric: She has a normal mood and affect.         ED Course   Procedures  Labs Reviewed   URINALYSIS, REFLEX TO URINE CULTURE - Abnormal       Result Value    Color, UA Yellow      Appearance, UA Turbid (*)     Specific Gravity, UA 1.009      pH, UA 6.0      Protein, UA 1+ (*)     Glucose, UA Normal      Ketones, UA Negative      Blood, UA 2+ (*)     Bilirubin, UA Negative      Urobilinogen, UA Normal      Nitrites, UA 2+ (*)     Leukocyte Esterase,  (*)     RBC, UA 11-20 (*)     WBC, UA >100 (*)     WBC Clumps, UA Many (*)     Bacteria, UA Many (*)     Squamous Epithelial Cells, UA Trace      Mucous, UA Few (*)    COMPREHENSIVE METABOLIC PANEL - Abnormal    Sodium 137      Potassium 3.7      Chloride 107      CO2 21 (*)     Glucose 96      Blood Urea Nitrogen 14.5      Creatinine 0.84      Calcium 8.7      Protein Total 7.3      Albumin 3.5       Globulin 3.8 (*)     Albumin/Globulin Ratio 0.9 (*)     Bilirubin Total 0.4      ALP 93      ALT 14      AST 25      eGFR >60      Anion Gap 9.0      BUN/Creatinine Ratio 17     CBC WITH DIFFERENTIAL - Abnormal    WBC 11.03      RBC 4.17 (*)     Hgb 13.1      Hct 39.4      MCV 94.5 (*)     MCH 31.4 (*)     MCHC 33.2      RDW 13.2      Platelet 292      MPV 9.0      Neut % 68.3      Lymph % 19.3      Mono % 7.3      Eos % 3.8      Basophil % 0.7      Imm Grans % 0.6      Neut # 7.53      Lymph # 2.13      Mono # 0.80      Eos # 0.42      Baso # 0.08      Imm Gran # 0.07 (*)     NRBC% 0.0     CULTURE, URINE   CBC W/ AUTO DIFFERENTIAL    Narrative:     The following orders were created for panel order CBC auto differential.  Procedure                               Abnormality         Status                     ---------                               -----------         ------                     CBC with Differential[[id number]]        Abnormal            Final result                 Please view results for these tests on the individual orders.          Imaging Results              CT Abdomen Pelvis  Without Contrast (Final result)  Result time 02/09/25 14:23:01      Final result by Wallace Buenrostro MD (02/09/25 14:23:01)                   Impression:      Cystitis.  Few tiny nonobstructing renal stones.      Electronically signed by: Wallace Buenrostro  Date:    02/09/2025  Time:    14:23               Narrative:    EXAMINATION:  CT ABDOMEN PELVIS WITHOUT CONTRAST    CLINICAL HISTORY:  LLQ abdominal pain;LLQ pain, dysuria, hx kidney stones;    TECHNIQUE:  CT imaging of the abdomen and pelvis without intravenous contrast. Axial, coronal and sagittal reformatted images reviewed. Dose length product is 195 mGycm. Automatic exposure control, adjustment of mA/kV or iterative reconstruction technique used to limit radiation dose.    COMPARISON:  CT 05/27/2023    FINDINGS:  Assessment of the visceral organs and vasculature is  limited by the lack of IV contrast.    Liver/biliary: Stable 5 cm hepatic cyst.  9 mm gallstone.  No biliary dilatation.    Pancreas: Normal.    Spleen: Normal.    Adrenals: Normal.    Genitourinary: Few tiny right renal stones.  No ureteral stone or hydronephrosis identified.  Bladder under distended with adjacent stranding and multiple bladder diverticula.    Stomach/bowel: No bowel obstruction. Appendix not confidently seen.  No discernible bowel inflammation.    Lymph nodes and peritoneum: No pathologically enlarged lymph node identified with noncontrast technique. No ascites or free air.    Vasculature: Aortoiliac atherosclerosis.    Abdominal wall: Normal.    Lung bases: No consolidation or pleural effusion.    Bones: No acute osseous findings.                                       Medications - No data to display  Medical Decision Making  Problems Addressed:  Acute cystitis without hematuria: acute illness or injury  LLQ abdominal pain: acute illness or injury    Amount and/or Complexity of Data Reviewed  Radiology: ordered.    Risk  Prescription drug management.      ED assessment:    Ms. Armijo presented for evaluation of dysuria, abdominal pain, on leftover cefdinir at home.  Afebrile, nontoxic appearing, hemodynamically stable.  Abdominal tenderness without peritonitis.    Differential diagnosis (including but not limited to):   Urinary tract infection, cystitis, ascending infection such as pyelonephritis, ureterolithiasis, urinary outflow obstruction, diverticulitis, colitis, enteritis    ED management:  Laboratory studies with UA suggesting urinary tract infection, otherwise unremarkable.  Given abdominal tenderness and advanced age, CT scan obtained which demonstrates findings consistent with cystitis, no obstructive uropathy or other acute or bowel pathology.  Discussed results with the patient.  Will fill cefdinir course.  Advised to follow up with the primary care physician.    My independent  radiology interpretation:   CT abdomen and pelvis: No dilated or fluid-filled bowel loops, no free air or free fluid, no ureterolithiasis or hydroureteronephrosis.  Bladder wall thickening.      Amount and/or Complexity of Data Reviewed  Independent historian: none   Summary of history:   External data reviewed: prior labs  Summary of data reviewed:  Previous labs reviewed, last urine culture on record from May 2023 with pansensitive E coli  Risk and benefits of testing: discussed   Labs: ordered and reviewed  Radiology: ordered and independent interpretation performed (see above or ED course)      Risk  Prescription drug management   Shared decision making     Critical Care  none    IDenise MD personally performed the history, PE, MDM, and procedures as documented above and agree with the scribe's documentation.              ED Course as of 03/10/25 1722   Sun Feb 09, 2025   1415 UA consistent with urinary tract infection.  In review of the previous urine cultures on record, she would be pansensitive [KS]      ED Course User Index  [KS] Denise Castro MD                           Clinical Impression:  Final diagnoses:  [N30.00] Acute cystitis without hematuria (Primary)          ED Disposition Condition    Discharge Stable          ED Prescriptions       Medication Sig Dispense Start Date End Date Auth. Provider    cefdinir (OMNICEF) 300 MG capsule Take 1 capsule (300 mg total) by mouth 2 (two) times daily. for 10 days 20 capsule 2/9/2025 2/19/2025 Denise Castro MD          Follow-up Information       Follow up With Specialties Details Why Contact Info    Your primary care physician  Schedule an appointment as soon as possible for a visit   Call your primary care physician or you can call [phone] to schedule with a primary care physician    [facility] - Emergency Dept Emergency Medicine  As needed, If symptoms worsen [id number] [facility]  [id number]  [phone]             Denise Castro MD  03/10/25 6611

## 2025-02-09 NOTE — FIRST PROVIDER EVALUATION
Medical screening examination initiated.  I have conducted a focused provider triage encounter, findings are as follows:    Chief Complaint   Patient presents with    Urinary Frequency     Pt co bladder infection, sees [facility]. Pt co that her urine was tan color for past few days and today it cleared up but is still burning. Pt found old ABX cefidinir took last 4 pills     Brief history of present illness:  84 y.o. female presents to the ED with dysuria, bladder pressure, and left lower abdominal pain for the last few days. History of kidney stones requiring stent placement. Sees Dr Anaya. States she started taking old antibiotics that she had at home and notes improvement.     There were no vitals filed for this visit.    Pertinent physical exam:  Awake, alert, ambulatory, non-labored respirations    Brief workup plan:  labs, UA    Preliminary workup initiated; this workup will be continued and followed by the physician or advanced practice provider that is assigned to the patient when roomed.

## 2025-02-11 LAB — BACTERIA UR CULT: ABNORMAL

## (undated) DEVICE — POSITIONER HEAD AD 9X8X4.5IN

## (undated) DEVICE — NDL SYR 10ML 18X1.5 LL BLUNT

## (undated) DEVICE — GLOVE PROTEXIS BLUE LATEX 6.5

## (undated) DEVICE — TRAY SKIN SCRUB WET PREMIUM

## (undated) DEVICE — LUBRICANT SURGILUBE 2 OZ

## (undated) DEVICE — GLOVE SENSICARE PI SLT 7.5

## (undated) DEVICE — Device

## (undated) DEVICE — GOWN SMARTSLEEVE AAMI LVL4 XXL

## (undated) DEVICE — CONTAINER SPECIMEN SCREW 4OZ

## (undated) DEVICE — BOWL STERILE LARGE 32OZ

## (undated) DEVICE — SOL IRR WATER STRL 3000 ML

## (undated) DEVICE — SYR 30CC LUER LOCK

## (undated) DEVICE — GAUZE SPONGE 4X4 12PLY

## (undated) DEVICE — SUPPORT ULNA NERVE PROTECTOR

## (undated) DEVICE — GLOVE PROTEXIS LTX MICRO 8

## (undated) DEVICE — MARKER WRITESITE SKIN CHLRAPRP

## (undated) DEVICE — GUIDEWIRE STR TIP HIWIRE 150CM

## (undated) DEVICE — KIT SURGICAL TURNOVER

## (undated) DEVICE — GLOVE PROTEXIS HYDROGEL SZ6.5

## (undated) DEVICE — CATH POLLACK OPEN-END FLEXI-TI

## (undated) DEVICE — SOL IRRI STRL WATER 1000ML

## (undated) DEVICE — BAG DRAIN UROLOGY AND HOSE

## (undated) DEVICE — FIBER LASER HOLMIUM 365 MICRON

## (undated) DEVICE — STOPCOCK IV 4 WAY LG BOR ROT M

## (undated) DEVICE — ADAPTER STOPCOCK FEMALE LL

## (undated) DEVICE — EXTRACTOR STONE 1.5FR X 115 CM

## (undated) DEVICE — GLOVE PROTEXIS PI SYN SURG 6.0

## (undated) DEVICE — SOL IRR NACL .9% 3000ML